# Patient Record
Sex: FEMALE | Race: WHITE | NOT HISPANIC OR LATINO | Employment: FULL TIME | URBAN - NONMETROPOLITAN AREA
[De-identification: names, ages, dates, MRNs, and addresses within clinical notes are randomized per-mention and may not be internally consistent; named-entity substitution may affect disease eponyms.]

---

## 2017-06-16 ENCOUNTER — OFFICE VISIT (OUTPATIENT)
Dept: FAMILY MEDICINE CLINIC | Facility: CLINIC | Age: 62
End: 2017-06-16

## 2017-06-16 VITALS
HEART RATE: 65 BPM | SYSTOLIC BLOOD PRESSURE: 151 MMHG | DIASTOLIC BLOOD PRESSURE: 90 MMHG | WEIGHT: 241 LBS | OXYGEN SATURATION: 96 % | TEMPERATURE: 98 F | BODY MASS INDEX: 40.15 KG/M2 | HEIGHT: 65 IN | RESPIRATION RATE: 18 BRPM

## 2017-06-16 DIAGNOSIS — E66.01 MORBID OBESITY WITH BMI OF 40.0-44.9, ADULT (HCC): ICD-10-CM

## 2017-06-16 DIAGNOSIS — E03.8 OTHER SPECIFIED HYPOTHYROIDISM: Primary | ICD-10-CM

## 2017-06-16 DIAGNOSIS — Z00.00 HEALTH CARE MAINTENANCE: ICD-10-CM

## 2017-06-16 LAB
ALBUMIN SERPL-MCNC: 3.9 G/DL (ref 3.5–5)
ALBUMIN/GLOB SERPL: 1.4 G/DL (ref 1–2)
ALP SERPL-CCNC: 66 U/L (ref 38–126)
ALT SERPL-CCNC: 33 U/L (ref 13–69)
AST SERPL-CCNC: 21 U/L (ref 15–46)
BASOPHILS # BLD AUTO: 0.04 10*3/MM3 (ref 0–0.2)
BASOPHILS NFR BLD AUTO: 0.6 % (ref 0–2.5)
BILIRUB SERPL-MCNC: 0.5 MG/DL (ref 0.2–1.3)
BUN SERPL-MCNC: 17 MG/DL (ref 7–20)
BUN/CREAT SERPL: 17 (ref 7.1–23.5)
CALCIUM SERPL-MCNC: 9.7 MG/DL (ref 8.4–10.2)
CHLORIDE SERPL-SCNC: 102 MMOL/L (ref 98–107)
CHOLEST SERPL-MCNC: 213 MG/DL (ref 0–199)
CO2 SERPL-SCNC: 28 MMOL/L (ref 26–30)
CREAT SERPL-MCNC: 1 MG/DL (ref 0.6–1.3)
EOSINOPHIL # BLD AUTO: 0.18 10*3/MM3 (ref 0–0.7)
EOSINOPHIL NFR BLD AUTO: 2.8 % (ref 0–7)
ERYTHROCYTE [DISTWIDTH] IN BLOOD BY AUTOMATED COUNT: 13.8 % (ref 11.5–14.5)
GLOBULIN SER CALC-MCNC: 2.8 GM/DL
GLUCOSE SERPL-MCNC: 93 MG/DL (ref 74–98)
HCT VFR BLD AUTO: 39.6 % (ref 37–47)
HDLC SERPL-MCNC: 61 MG/DL (ref 40–60)
HGB BLD-MCNC: 13.3 G/DL (ref 12–16)
IMM GRANULOCYTES # BLD: 0.01 10*3/MM3 (ref 0–0.06)
IMM GRANULOCYTES NFR BLD: 0.2 % (ref 0–0.6)
LDLC SERPL CALC-MCNC: 129 MG/DL (ref 0–99)
LYMPHOCYTES # BLD AUTO: 1.32 10*3/MM3 (ref 0.6–3.4)
LYMPHOCYTES NFR BLD AUTO: 20.8 % (ref 10–50)
MCH RBC QN AUTO: 31.8 PG (ref 27–31)
MCHC RBC AUTO-ENTMCNC: 33.6 G/DL (ref 30–37)
MCV RBC AUTO: 94.7 FL (ref 81–99)
MONOCYTES # BLD AUTO: 0.59 10*3/MM3 (ref 0–0.9)
MONOCYTES NFR BLD AUTO: 9.3 % (ref 0–12)
NEUTROPHILS # BLD AUTO: 4.21 10*3/MM3 (ref 2–6.9)
NEUTROPHILS NFR BLD AUTO: 66.3 % (ref 37–80)
NRBC BLD AUTO-RTO: 0 /100 WBC (ref 0–0)
PLATELET # BLD AUTO: 334 10*3/MM3 (ref 130–400)
POTASSIUM SERPL-SCNC: 4.4 MMOL/L (ref 3.5–5.1)
PROT SERPL-MCNC: 6.7 G/DL (ref 6.3–8.2)
RBC # BLD AUTO: 4.18 10*6/MM3 (ref 4.2–5.4)
SODIUM SERPL-SCNC: 140 MMOL/L (ref 137–145)
T4 FREE SERPL-MCNC: 1.76 NG/DL (ref 0.78–2.19)
TRIGL SERPL-MCNC: 113 MG/DL
TSH SERPL DL<=0.005 MIU/L-ACNC: 2.73 MIU/ML (ref 0.47–4.68)
VLDLC SERPL CALC-MCNC: 22.6 MG/DL
WBC # BLD AUTO: 6.35 10*3/MM3 (ref 4.8–10.8)

## 2017-06-16 PROCEDURE — 99213 OFFICE O/P EST LOW 20 MIN: CPT | Performed by: INTERNAL MEDICINE

## 2017-06-16 RX ORDER — LEVOTHYROXINE SODIUM 137 UG/1
137 TABLET ORAL DAILY
Qty: 30 TABLET | Refills: 5 | Status: SHIPPED | OUTPATIENT
Start: 2017-06-16 | End: 2018-01-19 | Stop reason: SDUPTHER

## 2017-06-16 RX ORDER — ASPIRIN 81 MG/1
81 TABLET, CHEWABLE ORAL DAILY
COMMUNITY
End: 2021-03-22

## 2017-06-16 RX ORDER — ALBUTEROL SULFATE 90 UG/1
90 AEROSOL, METERED RESPIRATORY (INHALATION) AS NEEDED
COMMUNITY
Start: 2015-10-14 | End: 2018-02-19 | Stop reason: SDUPTHER

## 2017-06-23 NOTE — PROGRESS NOTES
Subjective   Sandra Guerrero is a 61 y.o. female.     Chief Complaint   Patient presents with   • [facility]     meds refill       History of Present Illness   F/U visit.   Patient is on Levothyroxine 137 mcg for Hypothyroidism and Qvar and Albuterol PRN for Asthma.    The following portions of the patient's history were reviewed and updated as appropriate: allergies, current medications, past family history, past medical history, past social history, past surgical history and problem list.    Past Medical History:   Diagnosis Date   • Adverse effect of drug/medicinal     Adverse effect of drug, medicament, or biological substance   • Allergic rhinitis    • Elevated blood pressure reading without diagnosis of hypertension    • History of abnormal mammogram        No past surgical history on file.    No current outpatient prescriptions on file prior to visit.     No current facility-administered medications on file prior to visit.        Social History     Social History   • Marital status:      Spouse name: N/A   • Number of children: N/A   • Years of education: N/A     Occupational History   • Not on file.     Social History Main Topics   • Smoking status: Never Smoker   • Smokeless tobacco: Not on file   • Alcohol use No   • Drug use: No   • Sexual activity: Not on file     Other Topics Concern   • Not on file     Social History Narrative       Review of Systems   Constitutional: Negative for chills, fatigue and fever.   HENT: Negative for congestion, ear pain, rhinorrhea, sinus pressure and sore throat.    Eyes: Negative for visual disturbance.   Respiratory: Negative for cough, chest tightness, shortness of breath and wheezing.    Cardiovascular: Negative for chest pain, palpitations and leg swelling.   Gastrointestinal: Negative for abdominal pain, blood in stool, constipation, diarrhea, nausea and vomiting.   Endocrine: Negative for polydipsia and polyuria.   Genitourinary: Negative for dysuria and  "hematuria.   Musculoskeletal: Negative for back pain.   Skin: Negative for rash.   Neurological: Negative for dizziness, light-headedness, numbness and headaches.   Psychiatric/Behavioral: Negative for dysphoric mood and sleep disturbance. The patient is not nervous/anxious.        Objective   Blood pressure 151/90, pulse 65, temperature 98 °F (36.7 °C), temperature source Oral, resp. rate 18, height 65\" (165.1 cm), weight 241 lb (109 kg), SpO2 96 %.    Physical Exam   Constitutional: She is oriented to person, place, and time. She appears well-nourished. No distress.   HENT:   Head: Atraumatic.   Right Ear: External ear normal.   Left Ear: External ear normal.   Eyes: Conjunctivae are normal. Right eye exhibits no discharge. Left eye exhibits no discharge.   Neck: Normal range of motion. Neck supple.   Cardiovascular: Normal rate and regular rhythm.    No murmur heard.  Pulmonary/Chest: Effort normal and breath sounds normal. She has no wheezes. She has no rales.   Abdominal: Soft. Bowel sounds are normal. She exhibits no distension. There is no tenderness.   Neurological: She is alert and oriented to person, place, and time.   Skin: No rash noted. She is not diaphoretic.   Psychiatric: She has a normal mood and affect.   Nursing note and vitals reviewed.    Assessment/Plan   Sandra was seen today for establish care.    Diagnoses and all orders for this visit:    Other specified hypothyroidism  -     Cancel: TSH  -     Cancel: T4, free  -     Cancel: Comprehensive Metabolic Panel  -     Cancel: CBC w AUTO Differential  -     CBC & Differential  -     Comprehensive Metabolic Panel  -     TSH  -     T4, free  -     Lipid panel    Morbid obesity with BMI of 40.0-44.9, adult  -     Cancel: Comprehensive Metabolic Panel  -     Cancel: CBC w AUTO Differential  -     Cancel: Lipid Panel  -     CBC & Differential  -     Comprehensive Metabolic Panel  -     TSH  -     T4, free  -     Lipid panel    Health care maintenance  - "     Cancel: Occult Blood, Fecal By Immunoassay    Other orders  -     levothyroxine (SYNTHROID, LEVOTHROID) 137 MCG tablet; Take 1 tablet by mouth Daily.               Return in about 3 months (around 9/16/2017).    There are no Patient Instructions on file for this visit.

## 2017-08-09 ENCOUNTER — TRANSCRIBE ORDERS (OUTPATIENT)
Dept: MAMMOGRAPHY | Facility: HOSPITAL | Age: 62
End: 2017-08-09

## 2017-08-09 DIAGNOSIS — Z12.39 SCREENING FOR BREAST CANCER: Primary | ICD-10-CM

## 2017-08-30 ENCOUNTER — APPOINTMENT (OUTPATIENT)
Dept: MAMMOGRAPHY | Facility: HOSPITAL | Age: 62
End: 2017-08-30

## 2018-01-19 ENCOUNTER — TELEPHONE (OUTPATIENT)
Dept: INTERNAL MEDICINE | Facility: CLINIC | Age: 63
End: 2018-01-19

## 2018-01-19 RX ORDER — LEVOTHYROXINE SODIUM 137 UG/1
137 TABLET ORAL DAILY
Qty: 30 TABLET | Refills: 0 | Status: SHIPPED | OUTPATIENT
Start: 2018-01-19 | End: 2018-02-05 | Stop reason: SDUPTHER

## 2018-02-05 ENCOUNTER — OFFICE VISIT (OUTPATIENT)
Dept: INTERNAL MEDICINE | Facility: CLINIC | Age: 63
End: 2018-02-05

## 2018-02-05 VITALS
TEMPERATURE: 98.4 F | OXYGEN SATURATION: 98 % | DIASTOLIC BLOOD PRESSURE: 98 MMHG | BODY MASS INDEX: 38.15 KG/M2 | HEART RATE: 69 BPM | WEIGHT: 229 LBS | SYSTOLIC BLOOD PRESSURE: 158 MMHG | HEIGHT: 65 IN

## 2018-02-05 DIAGNOSIS — I10 ESSENTIAL HYPERTENSION: ICD-10-CM

## 2018-02-05 DIAGNOSIS — E03.9 HYPOTHYROIDISM (ACQUIRED): Primary | ICD-10-CM

## 2018-02-05 PROCEDURE — 99214 OFFICE O/P EST MOD 30 MIN: CPT | Performed by: PHYSICIAN ASSISTANT

## 2018-02-05 RX ORDER — LEVOTHYROXINE SODIUM 137 UG/1
137 TABLET ORAL DAILY
Qty: 30 TABLET | Refills: 0 | Status: SHIPPED | OUTPATIENT
Start: 2018-02-05 | End: 2018-02-19 | Stop reason: SDUPTHER

## 2018-02-05 RX ORDER — LOSARTAN POTASSIUM 25 MG/1
25 TABLET ORAL DAILY
Qty: 30 TABLET | Refills: 1 | Status: SHIPPED | OUTPATIENT
Start: 2018-02-05 | End: 2018-02-19 | Stop reason: SDUPTHER

## 2018-02-05 NOTE — PROGRESS NOTES
Sandra Guerrero is a 62 y.o. female.     Subjective   History of Present Illness   Here today for follow up of hypothyroidism. She has been taking 137 mcg daily on an empty stomach and waits at least 30 minutes before eating or taking any other medications. She feels well on this current dose. Denies abnormal fatigue.      She has never been diagnosed with hypertension, despite consistently elevated readings (142-158 systolic) since 2015. Patient denies chest pain, dyspnea, orthopnea, palpitations, lower extremity edema, headaches, weakness, visual disturbances or confusion.          The following portions of the patient's history were reviewed and updated as appropriate: allergies, current medications, past family history, past medical history, past social history, past surgical history and problem list.    Review of Systems    Constitutional: Negative for appetite change, chills, fatigue, fever and unexpected weight change.   HENT: Negative for congestion, ear pain, hearing loss, nosebleeds, postnasal drip, rhinorrhea, sore throat, tinnitus and trouble swallowing.    Eyes: Negative for photophobia, discharge and visual disturbance.   Respiratory: Negative for cough, chest tightness, shortness of breath and wheezing.    Cardiovascular: Negative for chest pain, palpitations and leg swelling.   Gastrointestinal: Negative for abdominal distention, abdominal pain, blood in stool, constipation, diarrhea, nausea and vomiting.   Endocrine: Negative for cold intolerance, heat intolerance, polydipsia, polyphagia and polyuria.   Musculoskeletal: Negative for arthralgias, back pain, joint swelling, myalgias, neck pain and neck stiffness.   Skin: Negative for color change, pallor, rash and wound.   Allergic/Immunologic: Negative for environmental allergies, food allergies and immunocompromised state.   Neurological: Negative for dizziness, tremors, seizures, weakness, numbness and headaches.   Hematological: Negative for  "adenopathy. Does not bruise/bleed easily.   Psychiatric/Behavioral: Negative for sleep disturbances, agitation, behavioral problems, confusion, hallucinations, self-injury and suicidal ideas. The patient is not nervous/anxious.      Objective    Physical Exam  Constitutional: Oriented to person, place, and time. Appears well-developed and well-nourished.   HENT:   Head: Normocephalic and atraumatic.   Eyes: EOM are normal. Pupils are equal, round, and reactive to light.   Neck: Normal range of motion. Neck supple.   Cardiovascular: Normal rate, regular rhythm and normal heart sounds.    Pulmonary/Chest: Effort normal and breath sounds normal. No respiratory distress.  Has no wheezes or rales. Exhibits no chest wall tenderness.   Abdominal: Soft. Bowel sounds are normal. Exhibits no distension and no mass. There is no tenderness.   Musculoskeletal: Normal range of motion. Exhibits no tenderness.   Neurological: Alert and oriented to person, place, and time.   Skin: Skin is warm and dry.   Psychiatric: Has a normal mood and affect. Behavior is normal. Judgment and thought content normal.       /98  Pulse 69  Temp 98.4 °F (36.9 °C)  Ht 165.1 cm (65\")  Wt 104 kg (229 lb)  SpO2 98%  BMI 38.11 kg/m2    Nursing note and vitals reviewed.        Assessment/Plan   Sandra was seen today for establish care and hypothyroidism.    Diagnoses and all orders for this visit:    Hypothyroidism (acquired)  -     TSH  -     T4  -     T3, Free    Clinically euthyroid. Unless labs indicate otherwise, continue levothyroxine (SYNTHROID, LEVOTHROID) 137 MCG tablet; Take 1 tablet by mouth Daily.    Essential hypertension  -     Comprehensive Metabolic Panel  -     Begin: losartan (COZAAR) 25 MG tablet; Take 1 tablet by mouth Daily.  Discussed risks, benefits and potential side effects of medication.     Encouraged low-sodium diet. Increase physical activity.       F/u in 2 weeks to recheck blood pressure and evaluate efficacy of " Losartan.

## 2018-02-10 LAB
ALBUMIN SERPL-MCNC: 3.8 G/DL (ref 3.5–5)
ALBUMIN/GLOB SERPL: 1.4 G/DL (ref 1–2)
ALP SERPL-CCNC: 59 U/L (ref 38–126)
ALT SERPL-CCNC: 27 U/L (ref 13–69)
AST SERPL-CCNC: 16 U/L (ref 15–46)
BILIRUB SERPL-MCNC: 0.4 MG/DL (ref 0.2–1.3)
BUN SERPL-MCNC: 21 MG/DL (ref 7–20)
BUN/CREAT SERPL: 21 (ref 7.1–23.5)
CALCIUM SERPL-MCNC: 9.4 MG/DL (ref 8.4–10.2)
CHLORIDE SERPL-SCNC: 105 MMOL/L (ref 98–107)
CO2 SERPL-SCNC: 27 MMOL/L (ref 26–30)
CREAT SERPL-MCNC: 1 MG/DL (ref 0.6–1.3)
GFR SERPLBLD CREATININE-BSD FMLA CKD-EPI: 57 ML/MIN/1.73
GFR SERPLBLD CREATININE-BSD FMLA CKD-EPI: 69 ML/MIN/1.73
GLOBULIN SER CALC-MCNC: 2.8 GM/DL
GLUCOSE SERPL-MCNC: 86 MG/DL (ref 74–98)
POTASSIUM SERPL-SCNC: 4.7 MMOL/L (ref 3.5–5.1)
PROT SERPL-MCNC: 6.6 G/DL (ref 6.3–8.2)
SODIUM SERPL-SCNC: 144 MMOL/L (ref 137–145)
T3FREE SERPL-MCNC: 2.3 PG/ML (ref 2–4.4)
T4 SERPL-MCNC: 11.1 UG/DL (ref 4.5–12)
TSH SERPL DL<=0.005 MIU/L-ACNC: 3.18 MIU/ML (ref 0.47–4.68)

## 2018-02-19 ENCOUNTER — OFFICE VISIT (OUTPATIENT)
Dept: INTERNAL MEDICINE | Facility: CLINIC | Age: 63
End: 2018-02-19

## 2018-02-19 VITALS
WEIGHT: 229 LBS | HEIGHT: 65 IN | TEMPERATURE: 98.4 F | BODY MASS INDEX: 38.15 KG/M2 | SYSTOLIC BLOOD PRESSURE: 130 MMHG | HEART RATE: 78 BPM | DIASTOLIC BLOOD PRESSURE: 88 MMHG | OXYGEN SATURATION: 98 %

## 2018-02-19 DIAGNOSIS — I10 ESSENTIAL HYPERTENSION: ICD-10-CM

## 2018-02-19 DIAGNOSIS — J45.20 MILD INTERMITTENT ASTHMA WITHOUT COMPLICATION: Primary | ICD-10-CM

## 2018-02-19 PROCEDURE — 99214 OFFICE O/P EST MOD 30 MIN: CPT | Performed by: PHYSICIAN ASSISTANT

## 2018-02-19 RX ORDER — LEVOTHYROXINE SODIUM 137 UG/1
137 TABLET ORAL DAILY
Qty: 30 TABLET | Refills: 5 | Status: SHIPPED | OUTPATIENT
Start: 2018-02-19 | End: 2018-07-18 | Stop reason: SDUPTHER

## 2018-02-19 RX ORDER — LOSARTAN POTASSIUM 50 MG/1
50 TABLET ORAL DAILY
Qty: 30 TABLET | Refills: 2 | Status: SHIPPED | OUTPATIENT
Start: 2018-02-19 | End: 2018-05-16 | Stop reason: SDUPTHER

## 2018-02-19 RX ORDER — ALBUTEROL SULFATE 90 UG/1
2 AEROSOL, METERED RESPIRATORY (INHALATION) EVERY 4 HOURS PRN
Qty: 1 INHALER | Refills: 2 | Status: SHIPPED | OUTPATIENT
Start: 2018-02-19 | End: 2019-01-18 | Stop reason: SDUPTHER

## 2018-02-19 NOTE — PROGRESS NOTES
Sandra Guerrero is a 62 y.o. female.     Subjective   History of Present Illness   Here today for 2 week follow up for new diagnosis of hypertension. She has been taking Losartan 25 mg daily as directed and reports she has not had any adverse reaction or side effects. Patient denies chest pain, dyspnea, orthopnea, palpitations, lower extremity edema, headaches, weakness, visual disturbances or confusion. She has been limiting her sodium intake and attempting to be more active.     She reports long-term seasonal trouble with Asthma and allergies. She previously used Advair which caused her blood pressure to elevate.  She has not had any trouble with Qvar but notes that it it expensive so she uses it sparingly. She denies SOA, cough or rhinorrhea today.       The following portions of the patient's history were reviewed and updated as appropriate: allergies, current medications, past family history, past medical history, past social history, past surgical history and problem list.    Review of Systems    Constitutional: Negative for appetite change, chills, fatigue, fever and unexpected weight change.   HENT: Negative for congestion, ear pain, hearing loss, nosebleeds, postnasal drip, rhinorrhea, sore throat, tinnitus and trouble swallowing.    Eyes: Negative for photophobia, discharge and visual disturbance.   Respiratory: Negative for cough, chest tightness, shortness of breath and wheezing.    Cardiovascular: Negative for chest pain, palpitations and leg swelling.   Gastrointestinal: Negative for abdominal distention, abdominal pain, blood in stool, constipation, diarrhea, nausea and vomiting.   Endocrine: Negative for cold intolerance, heat intolerance, polydipsia, polyphagia and polyuria.   Musculoskeletal: Negative for arthralgias, back pain, joint swelling, myalgias, neck pain and neck stiffness.   Skin: Negative for color change, pallor, rash and wound.    Allergic/Immunologic: Negative for environmental  "allergies, food allergies and immunocompromised state.   Neurological: Negative for dizziness, tremors, seizures, weakness, numbness and headaches.   Hematological: Negative for adenopathy. Does not bruise/bleed easily.   Psychiatric/Behavioral: Negative for sleep disturbances, agitation, behavioral problems, confusion, hallucinations, self-injury and suicidal ideas. The patient is not nervous/anxious.        Objective    Physical Exam  Constitutional: Obese. Oriented to person, place, and time. Appears well-developed and well-nourished.   HENT:   Head: Normocephalic and atraumatic.   Eyes: EOM are normal. Pupils are equal, round, and reactive to light.   Neck: Normal range of motion. Neck supple.   Cardiovascular: Normal rate, regular rhythm and normal heart sounds.    Pulmonary/Chest: Effort normal and breath sounds normal. No respiratory distress.  Has no wheezes or rales. Exhibits no chest wall tenderness.   Abdominal: Soft. Bowel sounds are normal. Exhibits no distension and no mass. There is no tenderness.   Musculoskeletal: Normal range of motion. Exhibits no tenderness.   Neurological: Alert and oriented to person, place, and time.   Skin: Skin is warm and dry.   Psychiatric: Has a normal mood and affect. Behavior is normal. Judgment and thought content normal.       /88  Pulse 78  Temp 98.4 °F (36.9 °C)  Ht 165.1 cm (65\")  Wt 104 kg (229 lb)  SpO2 98%  BMI 38.11 kg/m2    Nursing note and vitals reviewed.        Assessment/Plan   Sandra was seen today for follow-up and hypertension.    Diagnoses and all orders for this visit:    Mild intermittent asthma without complication  -     Refilled (likely needs PA, gave sample of Asmanex which she feels may get her through the entire season so will wait on PA): beclomethasone (QVAR) 80 MCG/ACT inhaler; Inhale 2 puffs 2 (Two) Times a Day.  -     Refilled: albuterol (VENTOLIN HFA) 108 (90 Base) MCG/ACT inhaler; Inhale 2 puffs Every 4 (Four) Hours As Needed " for Wheezing or Shortness of Air.  -     Sample: Mometasone Furoate (ASMANEX HFA) 100 MCG/ACT aerosol; Inhale 2 puffs 2 (Two) Times a Day As Needed (asthma).  Continue low-sodium diet. Encouraged increased physical activity and attempted weight loss.     Essential hypertension  -     Increase dose to further improve HTN: losartan (COZAAR) 50 MG tablet; Take 1 tablet by mouth Daily. Indications: High Blood Pressure Disorder    Labs reviewed with patient.       F/u 3 months.

## 2018-05-16 DIAGNOSIS — I10 ESSENTIAL HYPERTENSION: ICD-10-CM

## 2018-05-17 RX ORDER — LOSARTAN POTASSIUM 50 MG/1
TABLET ORAL
Qty: 30 TABLET | Refills: 1 | Status: SHIPPED | OUTPATIENT
Start: 2018-05-17 | End: 2018-06-22 | Stop reason: SDUPTHER

## 2018-06-18 ENCOUNTER — TRANSCRIBE ORDERS (OUTPATIENT)
Dept: INTERNAL MEDICINE | Facility: CLINIC | Age: 63
End: 2018-06-18

## 2018-06-18 DIAGNOSIS — Z12.39 SCREENING BREAST EXAMINATION: Primary | ICD-10-CM

## 2018-06-22 ENCOUNTER — OFFICE VISIT (OUTPATIENT)
Dept: INTERNAL MEDICINE | Facility: CLINIC | Age: 63
End: 2018-06-22

## 2018-06-22 VITALS
WEIGHT: 231 LBS | DIASTOLIC BLOOD PRESSURE: 92 MMHG | HEIGHT: 65 IN | TEMPERATURE: 98.4 F | HEART RATE: 65 BPM | SYSTOLIC BLOOD PRESSURE: 142 MMHG | BODY MASS INDEX: 38.49 KG/M2 | OXYGEN SATURATION: 98 %

## 2018-06-22 DIAGNOSIS — R22.1 LUMP IN THROAT: ICD-10-CM

## 2018-06-22 DIAGNOSIS — E03.9 ACQUIRED HYPOTHYROIDISM: ICD-10-CM

## 2018-06-22 DIAGNOSIS — Z00.00 PREVENTATIVE HEALTH CARE: ICD-10-CM

## 2018-06-22 DIAGNOSIS — B35.4 RINGWORM OF BODY: Primary | ICD-10-CM

## 2018-06-22 DIAGNOSIS — I10 ESSENTIAL HYPERTENSION: ICD-10-CM

## 2018-06-22 PROCEDURE — 99214 OFFICE O/P EST MOD 30 MIN: CPT | Performed by: PHYSICIAN ASSISTANT

## 2018-06-22 RX ORDER — PRENATAL VIT 91/IRON/FOLIC/DHA 28-975-200
COMBINATION PACKAGE (EA) ORAL 2 TIMES DAILY PRN
Qty: 24 G | Refills: 1 | Status: SHIPPED | OUTPATIENT
Start: 2018-06-22 | End: 2019-01-18

## 2018-06-22 RX ORDER — LOSARTAN POTASSIUM 50 MG/1
75 TABLET ORAL DAILY
Qty: 30 TABLET | Refills: 5 | Status: SHIPPED | OUTPATIENT
Start: 2018-06-22 | End: 2018-11-15 | Stop reason: SDUPTHER

## 2018-06-22 NOTE — PATIENT INSTRUCTIONS
Body Ringworm  Body ringworm is an infection of the skin that often causes a ring-shaped rash. Body ringworm can affect any part of your skin. It can spread easily to others. Body ringworm is also called tinea corporis.  What are the causes?  This condition is caused by funguses called dermatophytes. The condition develops when these funguses grow out of control on the skin.  You can get this condition if you touch a person or animal that has it. You can also get it if you share clothing, bedding, towels, or any other object with an infected person or pet.  What increases the risk?  This condition is more likely to develop in:  · Athletes who often make skin-to-skin contact with other athletes, such as wrestlers.  · People who share equipment and mats.  · People with a weakened immune system.    What are the signs or symptoms?  Symptoms of this condition include:  · Itchy, raised red spots and bumps.  · Red scaly patches.  · A ring-shaped rash. The rash may have:  ? A clear center.  ? Scales or red bumps at its center.  ? Redness near its borders.  ? Dry and scaly skin on or around it.    How is this diagnosed?  This condition can usually be diagnosed with a skin exam. A skin scraping may be taken from the affected area and examined under a microscope to see if the fungus is present.  How is this treated?  This condition may be treated with:  · An antifungal cream or ointment.  · An antifungal shampoo.  · Antifungal medicines. These may be prescribed if your ringworm is severe, keeps coming back, or lasts a long time.    Follow these instructions at home:  · Take over-the-counter and prescription medicines only as told by your health care provider.  · If you were given an antifungal cream or ointment:  ? Use it as told by your health care provider.  ? Wash the infected area and dry it completely before applying the cream or ointment.  · If you were given an antifungal shampoo:  ? Use it as told by your health care  provider.  ? Leave the shampoo on your body for 3-5 minutes before rinsing.  · While you have a rash:  ? Wear loose clothing to stop clothes from rubbing and irritating it.  ? Wash or change your bed sheets every night.  · If your pet has the same infection, take your pet to see a .  How is this prevented?  · Practice good hygiene.  · Wear sandals or shoes in public places and showers.  · Do not share personal items with others.  · Avoid touching red patches of skin on other people.  · Avoid touching pets that have bald spots.  · If you touch an animal that has a bald spot, wash your hands.  Contact a health care provider if:  · Your rash continues to spread after 7 days of treatment.  · Your rash is not gone in 4 weeks.  · The area around your rash gets red, warm, tender, and swollen.  This information is not intended to replace advice given to you by your health care provider. Make sure you discuss any questions you have with your health care provider.  Document Released: 12/15/2001 Document Revised: 05/25/2017 Document Reviewed: 10/13/2016  [employer] Interactive Patient Education © 2018 [employer]

## 2018-06-22 NOTE — PROGRESS NOTES
Sandra Guerrero is a 62 y.o. female.     Subjective   History of Present Illness   Here today for follow up of HTN. She has been taking Losartan 50 mg daily as directed. She has had increased headache frequency but nothing severe and resolves on its own. Patient denies chest pain, dyspnea, orthopnea, palpitations, lower extremity edema, weakness, visual disturbances or confusion.     Rash: red itchy areas which become scaly. Triamcinolone topical helps but not all spots clear up with treatment. Currently has 2 large ones on the right thigh and a small area on her chest.     She has also had pressure in the right side of her neck and sensation of lump in her throat for the last 4-5 weeks with no change since onset. No SOA or trouble swallowing.         The following portions of the patient's history were reviewed and updated as appropriate: allergies, current medications, past family history, past medical history, past social history, past surgical history and problem list.    Review of Systems    Constitutional: Negative for appetite change, chills, fatigue, fever and unexpected weight change.   HENT: lump in throat. Negative for congestion, ear pain, hearing loss, nosebleeds, postnasal drip, rhinorrhea, sore throat, tinnitus and trouble swallowing.    Eyes: Negative for photophobia, discharge and visual disturbance.   Respiratory: Negative for cough, chest tightness, shortness of breath and wheezing.    Cardiovascular: Negative for chest pain, palpitations and leg swelling.   Gastrointestinal: Negative for abdominal distention, abdominal pain, blood in stool, constipation, diarrhea, nausea and vomiting.   Endocrine: Negative for cold intolerance, heat intolerance, polydipsia, polyphagia and polyuria.   Musculoskeletal: neck pressure. Negative for arthralgias, back pain, joint swelling, myalgias and neck stiffness.   Skin: rash. Negative for color change, pallor and wound.   Allergic/Immunologic: Negative for  "environmental allergies, food allergies and immunocompromised state.   Neurological: headache. Negative for dizziness, tremors, seizures, weakness, numbness.  Hematological: Negative for adenopathy. Does not bruise/bleed easily.   Psychiatric/Behavioral: Negative for sleep disturbances, agitation, behavioral problems, confusion, hallucinations, self-injury and suicidal ideas. The patient is not nervous/anxious.      Objective    Physical Exam  Constitutional: Oriented to person, place, and time. Appears well-developed and well-nourished.   Head: Normocephalic and atraumatic.   Eyes: EOM are normal. Pupils are equal, round, and reactive to light.   Neck: mild thyromegaly. Normal range of motion. Neck supple.   Cardiovascular: Normal rate, regular rhythm and normal heart sounds.    Pulmonary/Chest: Effort normal and breath sounds normal. No respiratory distress.  Has no wheezes or rales. Exhibits no chest wall tenderness.   Abdominal: Soft. Bowel sounds are normal. Exhibits no distension and no mass. There is no tenderness.   Musculoskeletal: Normal range of motion. Exhibits no tenderness.   Neurological: Alert and oriented to person, place, and time.   Skin: erythematous, annular, scaling patch consistent with ringworm on right thigh and chest.  Skin is warm and dry.   Psychiatric: Has a normal mood and affect. Behavior is normal. Judgment and thought content normal.       /92   Pulse 65   Temp 98.4 °F (36.9 °C)   Ht 165.1 cm (65\")   Wt 105 kg (231 lb)   SpO2 98%   BMI 38.44 kg/m²     Nursing note and vitals reviewed.        Assessment/Plan   Sandra was seen today for follow-up and hypertension.    Diagnoses and all orders for this visit:    Ringworm of body  -     Begin: terbinafine (lamISIL) 1 % cream; Apply  topically 2 (Two) Times a Day As Needed (rash).    Essential hypertension  -     Dose increase: losartan (COZAAR) 50 MG tablet; Take 1.5 tablets by mouth Daily.  -     Comprehensive Metabolic " Panel    Acquired hypothyroidism  -     TSH  -     US Thyroid    Lump in throat  -     US Thyroid    Preventative health care  -     Lipid Panel      Return in 6 months for annual physical or sooner if needed.

## 2018-07-02 LAB
ALBUMIN SERPL-MCNC: 3.6 G/DL (ref 3.5–5)
ALBUMIN/GLOB SERPL: 1.2 G/DL (ref 1–2)
ALP SERPL-CCNC: 70 U/L (ref 38–126)
ALT SERPL-CCNC: 21 U/L (ref 13–69)
AST SERPL-CCNC: 18 U/L (ref 15–46)
BILIRUB SERPL-MCNC: 0.5 MG/DL (ref 0.2–1.3)
BUN SERPL-MCNC: 15 MG/DL (ref 7–20)
BUN/CREAT SERPL: 18.8 (ref 7.1–23.5)
CALCIUM SERPL-MCNC: 9.1 MG/DL (ref 8.4–10.2)
CHLORIDE SERPL-SCNC: 107 MMOL/L (ref 98–107)
CHOLEST SERPL-MCNC: 202 MG/DL (ref 0–199)
CO2 SERPL-SCNC: 26 MMOL/L (ref 26–30)
CREAT SERPL-MCNC: 0.8 MG/DL (ref 0.6–1.3)
GLOBULIN SER CALC-MCNC: 2.9 GM/DL
GLUCOSE SERPL-MCNC: 91 MG/DL (ref 74–98)
HDLC SERPL-MCNC: 57 MG/DL (ref 40–60)
LDLC SERPL CALC-MCNC: 129 MG/DL (ref 0–99)
POTASSIUM SERPL-SCNC: 4.3 MMOL/L (ref 3.5–5.1)
PROT SERPL-MCNC: 6.5 G/DL (ref 6.3–8.2)
SODIUM SERPL-SCNC: 139 MMOL/L (ref 137–145)
TRIGL SERPL-MCNC: 80 MG/DL
TSH SERPL DL<=0.005 MIU/L-ACNC: 2.77 MIU/ML (ref 0.47–4.68)
VLDLC SERPL CALC-MCNC: 16 MG/DL

## 2018-07-05 ENCOUNTER — HOSPITAL ENCOUNTER (OUTPATIENT)
Dept: ULTRASOUND IMAGING | Facility: HOSPITAL | Age: 63
Discharge: HOME | End: 2018-07-05
Admitting: PHYSICIAN ASSISTANT

## 2018-07-05 PROCEDURE — 76536 US EXAM OF HEAD AND NECK: CPT

## 2018-07-18 RX ORDER — LEVOTHYROXINE SODIUM 137 UG/1
137 TABLET ORAL DAILY
Qty: 30 TABLET | Refills: 5 | Status: SHIPPED | OUTPATIENT
Start: 2018-07-18 | End: 2019-01-18 | Stop reason: SDUPTHER

## 2018-07-18 RX ORDER — LEVOTHYROXINE SODIUM 137 UG/1
TABLET ORAL
Qty: 30 TABLET | Refills: 4 | OUTPATIENT
Start: 2018-07-18

## 2018-08-31 ENCOUNTER — HOSPITAL ENCOUNTER (OUTPATIENT)
Dept: MAMMOGRAPHY | Facility: HOSPITAL | Age: 63
Discharge: HOME | End: 2018-08-31
Admitting: INTERNAL MEDICINE

## 2018-08-31 DIAGNOSIS — Z12.39 SCREENING BREAST EXAMINATION: ICD-10-CM

## 2018-08-31 PROCEDURE — 77067 SCR MAMMO BI INCL CAD: CPT

## 2018-08-31 PROCEDURE — 77063 BREAST TOMOSYNTHESIS BI: CPT

## 2018-09-06 ENCOUNTER — TRANSCRIBE ORDERS (OUTPATIENT)
Dept: MAMMOGRAPHY | Facility: HOSPITAL | Age: 63
End: 2018-09-06

## 2018-09-06 DIAGNOSIS — R92.2 INCONCLUSIVE MAMMOGRAM: Primary | ICD-10-CM

## 2018-10-05 ENCOUNTER — HOSPITAL ENCOUNTER (OUTPATIENT)
Dept: ULTRASOUND IMAGING | Facility: HOSPITAL | Age: 63
Discharge: HOME | End: 2018-10-05

## 2018-10-05 ENCOUNTER — HOSPITAL ENCOUNTER (OUTPATIENT)
Dept: MAMMOGRAPHY | Facility: HOSPITAL | Age: 63
Discharge: HOME | End: 2018-10-05
Admitting: PHYSICIAN ASSISTANT

## 2018-10-05 DIAGNOSIS — R92.2 INCONCLUSIVE MAMMOGRAM: ICD-10-CM

## 2018-10-05 PROCEDURE — 77066 DX MAMMO INCL CAD BI: CPT

## 2018-10-05 PROCEDURE — 76642 ULTRASOUND BREAST LIMITED: CPT

## 2018-10-05 PROCEDURE — G0279 TOMOSYNTHESIS, MAMMO: HCPCS

## 2018-11-15 DIAGNOSIS — I10 ESSENTIAL HYPERTENSION: ICD-10-CM

## 2018-11-15 RX ORDER — LOSARTAN POTASSIUM 50 MG/1
75 TABLET ORAL DAILY
Qty: 45 TABLET | Refills: 2 | Status: SHIPPED | OUTPATIENT
Start: 2018-11-15 | End: 2019-01-18 | Stop reason: SDUPTHER

## 2019-01-18 ENCOUNTER — OFFICE VISIT (OUTPATIENT)
Dept: INTERNAL MEDICINE | Facility: CLINIC | Age: 64
End: 2019-01-18

## 2019-01-18 VITALS
OXYGEN SATURATION: 98 % | BODY MASS INDEX: 39.15 KG/M2 | SYSTOLIC BLOOD PRESSURE: 148 MMHG | TEMPERATURE: 98.2 F | HEIGHT: 65 IN | DIASTOLIC BLOOD PRESSURE: 100 MMHG | WEIGHT: 235 LBS | HEART RATE: 81 BPM

## 2019-01-18 DIAGNOSIS — E03.9 HYPOTHYROIDISM (ACQUIRED): ICD-10-CM

## 2019-01-18 DIAGNOSIS — Z23 NEED FOR PROPHYLACTIC VACCINATION AGAINST HEPATITIS A: ICD-10-CM

## 2019-01-18 DIAGNOSIS — I10 ESSENTIAL HYPERTENSION: ICD-10-CM

## 2019-01-18 DIAGNOSIS — Z23 NEED FOR TDAP VACCINATION: Primary | ICD-10-CM

## 2019-01-18 DIAGNOSIS — J45.20 MILD INTERMITTENT ASTHMA WITHOUT COMPLICATION: ICD-10-CM

## 2019-01-18 PROCEDURE — 99214 OFFICE O/P EST MOD 30 MIN: CPT | Performed by: PHYSICIAN ASSISTANT

## 2019-01-18 PROCEDURE — 90632 HEPA VACCINE ADULT IM: CPT | Performed by: PHYSICIAN ASSISTANT

## 2019-01-18 PROCEDURE — 90471 IMMUNIZATION ADMIN: CPT | Performed by: PHYSICIAN ASSISTANT

## 2019-01-18 PROCEDURE — 90472 IMMUNIZATION ADMIN EACH ADD: CPT | Performed by: PHYSICIAN ASSISTANT

## 2019-01-18 PROCEDURE — 90715 TDAP VACCINE 7 YRS/> IM: CPT | Performed by: PHYSICIAN ASSISTANT

## 2019-01-18 RX ORDER — AMLODIPINE BESYLATE 5 MG/1
5 TABLET ORAL DAILY
Qty: 30 TABLET | Refills: 5 | Status: SHIPPED | OUTPATIENT
Start: 2019-01-18 | End: 2019-06-27 | Stop reason: SDUPTHER

## 2019-01-18 RX ORDER — ALBUTEROL SULFATE 90 UG/1
2 AEROSOL, METERED RESPIRATORY (INHALATION) EVERY 4 HOURS PRN
Qty: 1 INHALER | Refills: 2 | Status: SHIPPED | OUTPATIENT
Start: 2019-01-18 | End: 2020-09-24

## 2019-01-18 RX ORDER — LOSARTAN POTASSIUM 50 MG/1
75 TABLET ORAL DAILY
Qty: 45 TABLET | Refills: 5 | Status: SHIPPED | OUTPATIENT
Start: 2019-01-18 | End: 2019-06-27 | Stop reason: SDUPTHER

## 2019-01-18 RX ORDER — INFLUENZA A VIRUS A/SINGAPORE/GP1908/2015 IVR-180 (H1N1) ANTIGEN (MDCK CELL DERIVED, PROPIOLACTONE INACTIVATED), INFLUENZA A VIRUS A/NORTH CAROLINA/04/2016 (H3N2) HEMAGGLUTININ ANTIGEN (MDCK CELL DERIVED, PROPIOLACTONE INACTIVATED), INFLUENZA B VIRUS B/IOWA/06/2017 HEMAGGLUTININ ANTIGEN (MDCK CELL DERIVED, PROPIOLACTONE INACTIVATED), INFLUENZA B VIRUS B/SINGAPORE/INFTT-16-0610/2016 HEMAGGLUTININ ANTIGEN (MDCK CELL DERIVED, PROPIOLACTONE INACTIVATED) 15; 15; 15; 15 UG/.5ML; UG/.5ML; UG/.5ML; UG/.5ML
INJECTION, SUSPENSION INTRAMUSCULAR
Refills: 0 | COMMUNITY
Start: 2018-10-27 | End: 2019-01-18

## 2019-01-18 RX ORDER — RANITIDINE 300 MG/1
300 TABLET ORAL
Refills: 3 | COMMUNITY
Start: 2018-12-30 | End: 2019-07-03

## 2019-01-18 RX ORDER — LEVOTHYROXINE SODIUM 137 UG/1
137 TABLET ORAL DAILY
Qty: 30 TABLET | Refills: 5 | Status: SHIPPED | OUTPATIENT
Start: 2019-01-18 | End: 2019-06-27 | Stop reason: SDUPTHER

## 2019-01-18 NOTE — PROGRESS NOTES
Sandra Guerrero is a 63 y.o. female.     Subjective   History of Present Illness   Here today for follow up of hypertension, asthma and hypothyroidism.     HTN: 6 months ago her Losartan dose was increased from 50 to 75 mg daily which has not improved her blood pressure. She does not monitor her blood pressure at home. Patient denies chest pain, dyspnea, orthopnea, palpitations, lower extremity edema, headaches, weakness, visual disturbances or confusion. She has never taken any other blood pressure medication besides Losartan.     Thyroid: she is taking levothyroxine each morning on an empty stomach and waiting at least 30 minutes before eating or taking other medications. She denies abnormal fatigue, weight change, temperature intolerance, skin changes, bowel habit changes or heart rate changes.     Asthma has been well controlled. She reports that she seldom uses Asmanex inhaler. She almost never uses her albuterol inhaler. She denies daily cough, SOA, wheezing or chest tightness.           The following portions of the patient's history were reviewed and updated as appropriate: allergies, current medications, past family history, past medical history, past social history, past surgical history and problem list.    Review of Systems    Constitutional: Negative for appetite change, chills, fatigue, fever and unexpected weight change.   Eyes: Negative for photophobia, discharge and visual disturbance.   Respiratory: Negative for cough, chest tightness, shortness of breath and wheezing.    Cardiovascular: Negative for chest pain, palpitations and leg swelling.   Gastrointestinal: Negative for abdominal distention, abdominal pain, blood in stool, constipation, diarrhea, nausea and vomiting.   Endocrine: Negative for cold intolerance, heat intolerance, polydipsia, polyphagia and polyuria.   Musculoskeletal: Negative for arthralgias, back pain, joint swelling, myalgias, neck pain and neck stiffness.   Skin: Negative for  "color change, pallor, rash and wound.   Allergic/Immunologic: Negative for environmental allergies, food allergies and immunocompromised state.   Neurological: Negative for dizziness, tremors, seizures, weakness, numbness and headaches.   Hematological: Negative for adenopathy. Does not bruise/bleed easily.   Psychiatric/Behavioral: Negative for sleep disturbances, agitation, behavioral problems, confusion, hallucinations, self-injury and suicidal ideas. The patient is not nervous/anxious.      Objective    Physical Exam  Constitutional: Appears well-developed and well-nourished.   Head: Normocephalic and atraumatic.   Eyes: EOM are normal. Pupils are equal, round, and reactive to light.   Neck: Normal range of motion. Neck supple.   Cardiovascular: trace bilateral pitting edema. Normal rate, regular rhythm and normal heart sounds.    Pulmonary/Chest: Effort normal and breath sounds normal. No respiratory distress.  Has no wheezes or rales. Exhibits no chest wall tenderness.   Abdominal: Soft. Bowel sounds are normal. Exhibits no distension and no mass. There is no tenderness.   Musculoskeletal: Normal range of motion. Exhibits no tenderness.   Neurological: Alert and oriented to person, place, and time.   Skin: Skin is warm and dry.   Psychiatric: Has a normal mood and affect. Behavior is normal. Judgment and thought content normal.       /100   Pulse 81   Temp 98.2 °F (36.8 °C)   Ht 165.1 cm (65\")   Wt 107 kg (235 lb)   SpO2 98%   BMI 39.11 kg/m²     Nursing note and vitals reviewed.        Assessment/Plan   Sandra was seen today for follow-up.    Diagnoses and all orders for this visit:    Need for Tdap vaccination  -     Tdap Vaccine Greater Than or Equal To 6yo IM    Mild intermittent asthma without complication  -     albuterol sulfate HFA (VENTOLIN HFA) 108 (90 Base) MCG/ACT inhaler; Inhale 2 puffs Every 4 (Four) Hours As Needed for Wheezing or Shortness of Air.    Essential hypertension  -     " Continue: losartan (COZAAR) 50 MG tablet; Take 1.5 tablets by mouth Daily.  -     Begin: amLODIPine (NORVASC) 5 MG tablet; Take 1 tablet by mouth Daily.  -     Comprehensive Metabolic Panel  Follow heart healthy/low salt diet.  Avoid processed foods.  Monitor blood pressure as discussed.  Exercise as tolerated up to 30 minutes 5 days per week.  Take medications as prescribed.    Need for prophylactic vaccination against hepatitis A  -     Hepatitis A Vaccine Adult IM    Hypothyroidism (acquired)  -     Continue: levothyroxine (SYNTHROID, LEVOTHROID) 137 MCG tablet; Take 1 tablet by mouth Daily.  -     TSH      Return in 1 month for physical and HTN f/u or sooner if needed.

## 2019-01-19 LAB
ALBUMIN SERPL-MCNC: 4 G/DL (ref 3.6–4.8)
ALBUMIN/GLOB SERPL: 1.6 {RATIO} (ref 1.2–2.2)
ALP SERPL-CCNC: 66 IU/L (ref 39–117)
ALT SERPL-CCNC: 15 IU/L (ref 0–32)
AST SERPL-CCNC: 16 IU/L (ref 0–40)
BILIRUB SERPL-MCNC: 0.2 MG/DL (ref 0–1.2)
BUN SERPL-MCNC: 16 MG/DL (ref 8–27)
BUN/CREAT SERPL: 19 (ref 12–28)
CALCIUM SERPL-MCNC: 9.1 MG/DL (ref 8.7–10.3)
CHLORIDE SERPL-SCNC: 103 MMOL/L (ref 96–106)
CO2 SERPL-SCNC: 24 MMOL/L (ref 20–29)
CREAT SERPL-MCNC: 0.84 MG/DL (ref 0.57–1)
GLOBULIN SER CALC-MCNC: 2.5 G/DL (ref 1.5–4.5)
GLUCOSE SERPL-MCNC: 88 MG/DL (ref 65–99)
POTASSIUM SERPL-SCNC: 4.3 MMOL/L (ref 3.5–5.2)
PROT SERPL-MCNC: 6.5 G/DL (ref 6–8.5)
SODIUM SERPL-SCNC: 141 MMOL/L (ref 134–144)
TSH SERPL DL<=0.005 MIU/L-ACNC: 2.15 UIU/ML (ref 0.45–4.5)

## 2019-02-20 ENCOUNTER — RESULTS ENCOUNTER (OUTPATIENT)
Dept: INTERNAL MEDICINE | Facility: CLINIC | Age: 64
End: 2019-02-20

## 2019-02-20 ENCOUNTER — OFFICE VISIT (OUTPATIENT)
Dept: INTERNAL MEDICINE | Facility: CLINIC | Age: 64
End: 2019-02-20

## 2019-02-20 VITALS
TEMPERATURE: 97.9 F | WEIGHT: 233 LBS | HEART RATE: 71 BPM | DIASTOLIC BLOOD PRESSURE: 70 MMHG | HEIGHT: 65 IN | OXYGEN SATURATION: 98 % | BODY MASS INDEX: 38.82 KG/M2 | SYSTOLIC BLOOD PRESSURE: 136 MMHG

## 2019-02-20 DIAGNOSIS — E03.9 HYPOTHYROIDISM (ACQUIRED): ICD-10-CM

## 2019-02-20 DIAGNOSIS — I10 ESSENTIAL HYPERTENSION: ICD-10-CM

## 2019-02-20 DIAGNOSIS — Z12.11 SCREENING FOR COLON CANCER: ICD-10-CM

## 2019-02-20 DIAGNOSIS — Z00.00 PHYSICAL EXAM: Primary | ICD-10-CM

## 2019-02-20 DIAGNOSIS — Z23 NEED FOR VACCINATION: ICD-10-CM

## 2019-02-20 DIAGNOSIS — J45.20 MILD INTERMITTENT ASTHMA WITHOUT COMPLICATION: ICD-10-CM

## 2019-02-20 DIAGNOSIS — K21.9 GASTROESOPHAGEAL REFLUX DISEASE, ESOPHAGITIS PRESENCE NOT SPECIFIED: ICD-10-CM

## 2019-02-20 LAB
25(OH)D3+25(OH)D2 SERPL-MCNC: 27.5 NG/ML
BASOPHILS # BLD AUTO: 0.05 10*3/MM3 (ref 0–0.2)
BASOPHILS NFR BLD AUTO: 0.8 % (ref 0–2.5)
CHOLEST SERPL-MCNC: 206 MG/DL (ref 0–199)
CHOLEST/HDLC SERPL: 3.38 {RATIO}
EOSINOPHIL # BLD AUTO: 0.27 10*3/MM3 (ref 0–0.7)
EOSINOPHIL NFR BLD AUTO: 4.4 % (ref 0–7)
ERYTHROCYTE [DISTWIDTH] IN BLOOD BY AUTOMATED COUNT: 13.5 % (ref 11.5–14.5)
HCT VFR BLD AUTO: 41.2 % (ref 37–47)
HDLC SERPL-MCNC: 61 MG/DL (ref 40–60)
HGB BLD-MCNC: 13.8 G/DL (ref 12–16)
IMM GRANULOCYTES # BLD AUTO: 0.02 10*3/MM3 (ref 0–0.06)
IMM GRANULOCYTES NFR BLD AUTO: 0.3 % (ref 0–0.6)
LDLC SERPL CALC-MCNC: 126 MG/DL (ref 0–99)
LYMPHOCYTES # BLD AUTO: 0.95 10*3/MM3 (ref 0.6–3.4)
LYMPHOCYTES NFR BLD AUTO: 15.4 % (ref 10–50)
MCH RBC QN AUTO: 32.7 PG (ref 27–31)
MCHC RBC AUTO-ENTMCNC: 33.5 G/DL (ref 30–37)
MCV RBC AUTO: 97.6 FL (ref 81–99)
MONOCYTES # BLD AUTO: 0.59 10*3/MM3 (ref 0–0.9)
MONOCYTES NFR BLD AUTO: 9.6 % (ref 0–12)
NEUTROPHILS # BLD AUTO: 4.28 10*3/MM3 (ref 2–6.9)
NEUTROPHILS NFR BLD AUTO: 69.5 % (ref 37–80)
NRBC BLD AUTO-RTO: 0 /100 WBC (ref 0–0)
PLATELET # BLD AUTO: 338 10*3/MM3 (ref 130–400)
RBC # BLD AUTO: 4.22 10*6/MM3 (ref 4.2–5.4)
TRIGL SERPL-MCNC: 97 MG/DL
VLDLC SERPL CALC-MCNC: 19.4 MG/DL
WBC # BLD AUTO: 6.16 10*3/MM3 (ref 4.8–10.8)

## 2019-02-20 PROCEDURE — 90732 PPSV23 VACC 2 YRS+ SUBQ/IM: CPT | Performed by: INTERNAL MEDICINE

## 2019-02-20 PROCEDURE — 99396 PREV VISIT EST AGE 40-64: CPT | Performed by: INTERNAL MEDICINE

## 2019-02-20 PROCEDURE — 90471 IMMUNIZATION ADMIN: CPT | Performed by: INTERNAL MEDICINE

## 2019-02-20 NOTE — PROGRESS NOTES
Chief Complaint   Patient presents with   • Annual Exam     Physical only.      Subjective   Sandra Guerrero is a 63 y.o. female.     Here today for annual PE.   PMH of HTN, asthma, hypothyroid, GERD, DJD.   SH: She does not drink or smoke.  She has never been  and does not have any children.  She has never had a Pap and declines to have one done.   HCM- Mammogram is UTD.  Tdap, Hep A #1 and flu vaccines are UTD.  She declines colonoscopy, no FH of colon cancer.   HTN- well controlled today, she does not check it.  Norvasc was added to losartan last month.  She has noted some trace edema. No CP, SOA or palpitations.    Asthma- she rarely has to use her albuterol.  She does not need her asmanex either.  Hypothyroid- she has been on medication at same dose for about 8 yrs.  She had hyperthyroid disease and was treated, then placed on synthroid.  Takes it on empty stomach daily  GERD- she takes TUMS as needed.  Only takes zantac as needed.  She sees Dr Aldrich ENT at [facility] and he monitors her GERD  DJD- she has diffuse DJD in her back, hands, feet, shoulders and neck.  She does not take any thing for it.    She does not see the eye doctor regulary.   She sees the dentist every 6 mo.  She does wear her seat belt regularly.  She does not text and drive.          The following portions of the patient's history were reviewed and updated as appropriate: allergies, current medications, past family history, past medical history, past social history, past surgical history and problem list.    Review of Systems   Constitutional: Negative for activity change, appetite change and unexpected weight change.   HENT: Negative for trouble swallowing and voice change.    Eyes: Negative for visual disturbance.   Respiratory: Negative for shortness of breath.    Cardiovascular: Positive for leg swelling. Negative for chest pain and palpitations.   Gastrointestinal: Negative for abdominal pain.   Endocrine: Positive for cold  "intolerance. Negative for heat intolerance.   Musculoskeletal: Positive for arthralgias.   Allergic/Immunologic: Negative for environmental allergies.   Neurological: Negative for headaches.   Psychiatric/Behavioral: Negative for dysphoric mood and sleep disturbance. The patient is not nervous/anxious.    All other systems reviewed and are negative.      Objective   /70   Pulse 71   Temp 97.9 °F (36.6 °C)   Ht 165.1 cm (65\")   Wt 106 kg (233 lb)   SpO2 98%   BMI 38.77 kg/m²   Body mass index is 38.77 kg/m².  Physical Exam   Constitutional: She is oriented to person, place, and time. She appears well-developed and well-nourished. No distress.   Pleasant female in no distress today, obese   HENT:   Head: Normocephalic and atraumatic.   Right Ear: External ear normal.   Left Ear: External ear normal.   Mouth/Throat: Oropharynx is clear and moist.   Eyes: Conjunctivae and EOM are normal. Pupils are equal, round, and reactive to light.   Neck: Normal range of motion. Neck supple. No thyromegaly present.   Cardiovascular: Normal rate, regular rhythm, normal heart sounds and intact distal pulses.   No murmur heard.  No carotid bruits   Pulmonary/Chest: Effort normal and breath sounds normal. No respiratory distress. She has no wheezes.   Abdominal: Soft. Bowel sounds are normal. She exhibits no distension. There is no tenderness.   Musculoskeletal: Normal range of motion. She exhibits no edema.   Lymphadenopathy:     She has no cervical adenopathy.   Neurological: She is alert and oriented to person, place, and time. No cranial nerve deficit. Coordination normal.   Psychiatric: She has a normal mood and affect. Her behavior is normal. Judgment and thought content normal.   Nursing note and vitals reviewed.      Assessment/Plan   Sandra Guerrero is here today and the following problems have been addressed:      Sandra was seen today for annual exam.    Diagnoses and all orders for this visit:    Physical exam  -   "   CBC & Differential  -     Lipid Panel With / Chol / HDL Ratio  -     Vitamin D 25 Hydroxy    Essential hypertension  -     CBC & Differential  -     Lipid Panel With / Chol / HDL Ratio    Gastroesophageal reflux disease, esophagitis presence not specified    Hypothyroidism (acquired)    Mild intermittent asthma without complication    Screening for colon cancer  -     Cologuard - Stool, Per Rectum; Future    Labs as noted  Recommend avoidance of processed foods  Exercise for 30 minutes 5 days a week as tolerated  Recommend annual eye doctor appointment, or as necessary  See your dentist twice a year.  Recommend that you brush teeth twice daily and floss minimum of once daily  Recommend regular seatbelt use  Do not text or use phone while driving  Pneumovax 23 given today  Hep A #2 due next visit   cologuard ordered for colon cancer screening  Patient declines colonoscopy, breast exam and Pap  Recommend vitamin D 1000 units daily to help maintain bone density    Return to clinic in 6 months for routine follow-up        Please note that portions of this note were completed with a voice recognition program.  Efforts were made to edit dictation, but occasionally words are mistranscribed.

## 2019-06-27 ENCOUNTER — TELEPHONE (OUTPATIENT)
Dept: INTERNAL MEDICINE | Facility: CLINIC | Age: 64
End: 2019-06-27

## 2019-06-27 DIAGNOSIS — E03.9 HYPOTHYROIDISM (ACQUIRED): ICD-10-CM

## 2019-06-27 DIAGNOSIS — J45.20 MILD INTERMITTENT ASTHMA WITHOUT COMPLICATION: ICD-10-CM

## 2019-06-27 DIAGNOSIS — I10 ESSENTIAL HYPERTENSION: ICD-10-CM

## 2019-06-27 RX ORDER — LOSARTAN POTASSIUM 50 MG/1
75 TABLET ORAL DAILY
Qty: 45 TABLET | Refills: 5 | Status: SHIPPED | OUTPATIENT
Start: 2019-06-27 | End: 2020-02-14

## 2019-06-27 RX ORDER — LEVOTHYROXINE SODIUM 137 UG/1
137 TABLET ORAL DAILY
Qty: 30 TABLET | Refills: 5 | Status: SHIPPED | OUTPATIENT
Start: 2019-06-27 | End: 2019-12-20

## 2019-06-27 RX ORDER — AMLODIPINE BESYLATE 5 MG/1
5 TABLET ORAL DAILY
Qty: 30 TABLET | Refills: 5 | Status: SHIPPED | OUTPATIENT
Start: 2019-06-27 | End: 2020-01-15

## 2019-06-27 NOTE — TELEPHONE ENCOUNTER
Patient called and states she received a voicemail that she did not need to keep her appointment tommrow for medication refills since she was due until august. She states she was fine with this and wanted to make sure her medications got sent in. She states she needs refills on all her medications but did not have the names with her. Please advise.

## 2019-07-03 ENCOUNTER — OFFICE VISIT (OUTPATIENT)
Dept: INTERNAL MEDICINE | Facility: CLINIC | Age: 64
End: 2019-07-03

## 2019-07-03 VITALS
HEART RATE: 68 BPM | OXYGEN SATURATION: 98 % | SYSTOLIC BLOOD PRESSURE: 138 MMHG | WEIGHT: 235 LBS | HEIGHT: 65 IN | TEMPERATURE: 96.4 F | BODY MASS INDEX: 39.15 KG/M2 | RESPIRATION RATE: 18 BRPM | DIASTOLIC BLOOD PRESSURE: 98 MMHG

## 2019-07-03 DIAGNOSIS — L24.7 IRRITANT CONTACT DERMATITIS DUE TO PLANTS, EXCEPT FOOD: Primary | ICD-10-CM

## 2019-07-03 PROCEDURE — 99213 OFFICE O/P EST LOW 20 MIN: CPT | Performed by: PHYSICIAN ASSISTANT

## 2019-07-03 RX ORDER — METHYLPREDNISOLONE 4 MG/1
TABLET ORAL
Qty: 1 EACH | Refills: 0 | Status: SHIPPED | OUTPATIENT
Start: 2019-07-03 | End: 2019-08-23

## 2019-07-03 NOTE — PROGRESS NOTES
Chief Complaint   Patient presents with   • Poison Ivy     Patient states she was working in her yard on 06/29/19 and noticed on 06/30/19 blisters and the poison ivy starting on her right hand and going up her arm and now to her left arm, face, neck, and rib cage.       Subjective   Sandra Guerrero is a 63 y.o. female    History of Present Illness     Saturday patient was working in her yard and burning brush.  Sunday evening she noted a rash to the right forearm.  This rash has continued to spread.  She has noted it has spread to her left hand, left forearm, right wrist and forearm, rib cage, and under the chin.  She has been using calamine lotion for symptom relief.  She has also taken benadryl at night, which has not helped the symptoms.      Past Medical History:   Diagnosis Date   • Adverse effect of drug/medicinal     Adverse effect of drug, medicament, or biological substance   • Allergic rhinitis    • Elevated blood pressure reading without diagnosis of hypertension    • History of abnormal mammogram      History reviewed. No pertinent surgical history.  Family History   Problem Relation Age of Onset   • Hypertension Other    • Migraines Other    • Obesity Other    • Arthritis Other    • Thyroid disease Other    • Stroke Other         CVA   • Cancer Other         malignant neoplasm   • Breast cancer Mother      Social History     Socioeconomic History   • Marital status:      Spouse name: Not on file   • Number of children: Not on file   • Years of education: Not on file   • Highest education level: Not on file   Tobacco Use   • Smoking status: Never Smoker   • Smokeless tobacco: Never Used   Substance and Sexual Activity   • Alcohol use: No   • Drug use: No     No Known Allergies      Review of Systems   Constitutional: Negative for activity change, appetite change, chills and fever.   Skin: Positive for rash.     Objective     Vitals:    07/03/19 1709   BP: 138/98   Pulse: 68   Resp: 18   Temp: 96.4  °F (35.8 °C)   SpO2: 98%       Physical Exam   Constitutional: She is oriented to person, place, and time. She appears well-developed and well-nourished. No distress.   HENT:   Head: Normocephalic and atraumatic.   Eyes: Conjunctivae and EOM are normal. Pupils are equal, round, and reactive to light.   Neck: Normal range of motion. Neck supple.   Cardiovascular: Normal rate, regular rhythm, normal heart sounds and intact distal pulses. Exam reveals no gallop and no friction rub.   No murmur heard.  Pulmonary/Chest: Effort normal and breath sounds normal. No respiratory distress. She has no wheezes. She has no rales.   Neurological: She is alert and oriented to person, place, and time.   Skin: Skin is warm and dry. Capillary refill takes less than 2 seconds. Rash noted. Rash is urticarial. She is not diaphoretic.        Left forearm lesion is weeping clear fluid.  Multiple areas of blister like lesions noted diffusely over the body   Psychiatric: She has a normal mood and affect. Her behavior is normal.   Nursing note and vitals reviewed.      Assessment/Plan     Sandra was seen today for poison ivy.    Diagnoses and all orders for this visit:    Irritant contact dermatitis due to plants, except food  -     methylPREDNISolone (MEDROL, RADHA,) 4 MG tablet; Take as directed on package instructions.    Start medrol dose pack.  Continue calamine lotion.  Can use benadryl at night and zyrtec during the day.  Cool compresses for additional relief.  Recommend patient wash all clothing or other soft surfaces she came in contact with the day she was preforming yard work.  Watch areas for any symptoms or signs of infection.  If symptoms are not improving or if they worsen RTC.  Patient verbalizes understanding and is in agreement of plan.     Return if symptoms worsen or fail to improve.    Savita Camacho PA-C

## 2019-08-23 ENCOUNTER — OFFICE VISIT (OUTPATIENT)
Dept: INTERNAL MEDICINE | Facility: CLINIC | Age: 64
End: 2019-08-23

## 2019-08-23 VITALS
WEIGHT: 233 LBS | HEIGHT: 65 IN | DIASTOLIC BLOOD PRESSURE: 82 MMHG | SYSTOLIC BLOOD PRESSURE: 136 MMHG | HEART RATE: 91 BPM | OXYGEN SATURATION: 98 % | BODY MASS INDEX: 38.82 KG/M2 | TEMPERATURE: 98.6 F

## 2019-08-23 DIAGNOSIS — M19.90 ARTHRITIS: ICD-10-CM

## 2019-08-23 DIAGNOSIS — J45.20 MILD INTERMITTENT ASTHMA WITHOUT COMPLICATION: ICD-10-CM

## 2019-08-23 DIAGNOSIS — K21.9 GASTROESOPHAGEAL REFLUX DISEASE, ESOPHAGITIS PRESENCE NOT SPECIFIED: ICD-10-CM

## 2019-08-23 DIAGNOSIS — E03.9 HYPOTHYROIDISM (ACQUIRED): ICD-10-CM

## 2019-08-23 DIAGNOSIS — I10 ESSENTIAL HYPERTENSION: Primary | ICD-10-CM

## 2019-08-23 PROCEDURE — 90632 HEPA VACCINE ADULT IM: CPT | Performed by: INTERNAL MEDICINE

## 2019-08-23 PROCEDURE — 90471 IMMUNIZATION ADMIN: CPT | Performed by: INTERNAL MEDICINE

## 2019-08-23 PROCEDURE — 99214 OFFICE O/P EST MOD 30 MIN: CPT | Performed by: INTERNAL MEDICINE

## 2019-08-23 NOTE — PROGRESS NOTES
Chief Complaint   Patient presents with   • Follow-up     for HTN and Hypothyroidism.      Subjective   Sandra Guerrero is a 63 y.o. female.     Here today for follow-up of HTN, asthma, hypothyroid, GERD and DJD.  HTN- well controlled today, she does not check it.  No CP, SOA or palpitations or edema  Asthma- she has had a few bouts of asthma with associated URIs.  She used her albuterol and it was helpful.  Her allergies have been bothersome, she uses nasonex daily and uses benadryl prn  Hypothyroid- Takes medication on empty stomach daily, no symptoms of constipation, difficulty swallowing, change in voice or hair loss.  Weight remains stable  GERD- she takes TUMS as needed.  DJD- she has diffuse DJD in her hands, knee and ankle.  She takes turmeric, it helps some, uses advil if needed  HCM-mammogram is due October.  Cologuard was ordered 6 months ago during physical exam and remains pending.  She had first hepatitis A vaccine in January. She has not had shingrix vaccine.  She declines pap.          The following portions of the patient's history were reviewed and updated as appropriate: allergies, current medications, past family history, past medical history, past social history, past surgical history and problem list.    Review of Systems   Constitutional: Negative for activity change, appetite change and unexpected weight change.   HENT: Negative for trouble swallowing and voice change.    Eyes: Negative for visual disturbance.   Respiratory: Negative for shortness of breath.    Cardiovascular: Negative for chest pain, palpitations and leg swelling.   Gastrointestinal: Negative for abdominal pain.   Endocrine: Negative for cold intolerance and heat intolerance.   Musculoskeletal: Positive for arthralgias.   Allergic/Immunologic: Positive for environmental allergies.   Neurological: Negative for headaches.   Psychiatric/Behavioral: Negative for dysphoric mood and sleep disturbance. The patient is not  "nervous/anxious.        Objective   /82   Pulse 91   Temp 98.6 °F (37 °C)   Ht 165.1 cm (65\")   Wt 106 kg (233 lb)   SpO2 98%   BMI 38.77 kg/m²   Body mass index is 38.77 kg/m².  Physical Exam   Constitutional: She is oriented to person, place, and time. She appears well-developed and well-nourished. No distress.   Very pleasant female who appears her stated age, she is in no distress today   HENT:   Head: Normocephalic and atraumatic.   Right Ear: External ear normal.   Left Ear: External ear normal.   Mouth/Throat: Oropharynx is clear and moist.   Tympanic membranes normal, turbinates pale and boggy with clear rhinorrhea, clear postnasal drip noted   Eyes: Conjunctivae and EOM are normal. Pupils are equal, round, and reactive to light.   Neck: Normal range of motion. Neck supple. No thyromegaly present.   Cardiovascular: Normal rate, regular rhythm, normal heart sounds and intact distal pulses.   No murmur heard.  No carotid bruits   Pulmonary/Chest: Effort normal and breath sounds normal. No respiratory distress. She has no wheezes.   Abdominal: Soft. Bowel sounds are normal. She exhibits no distension. There is no tenderness.   Musculoskeletal: Normal range of motion. She exhibits no edema.   Lymphadenopathy:     She has no cervical adenopathy.   Neurological: She is alert and oriented to person, place, and time. No cranial nerve deficit. Coordination normal.   Psychiatric: She has a normal mood and affect. Her behavior is normal. Judgment and thought content normal.   Nursing note and vitals reviewed.      Assessment/Plan   Sandra Guerrero is here today and the following problems have been addressed:      Sandra was seen today for follow-up.    Diagnoses and all orders for this visit:    Essential hypertension    Hypothyroidism (acquired)    Gastroesophageal reflux disease, esophagitis presence not specified    Arthritis    Mild intermittent asthma without complication        Follow heart healthy/low " salt diet  Avoid processed foods  Monitor blood pressure as discussed  Exercise as tolerated up to 150 minutes per week  Take all medications as prescribed  She was given Hep A #2 today  Labs are UTD  She states she turned in her cologuard 5 mo ago and results are not in chart, we will try to track down results  Continue PRN albuterol for asthma flares  Continue Tums as needed for GERD symptoms  Continue turmeric and IBU prn for DJD sxs    Return in about 6 months (around 2/23/2020) for Annual.      Marilyn K. Vermeesch, MD      Please note that portions of this note were completed with a voice recognition program.  Efforts were made to edit dictation, but occasionally words are mistranscribed.

## 2019-08-28 ENCOUNTER — TELEPHONE (OUTPATIENT)
Dept: INTERNAL MEDICINE | Facility: CLINIC | Age: 64
End: 2019-08-28

## 2019-08-28 NOTE — TELEPHONE ENCOUNTER
When Pt was last here in office she advised Dr. Vermeesch that she sent her cologuard in but hasn't heard anything. We also haven't received a result. Nidhi looked Pt up in [employer] portal and seen that Pt gave too much of a sample and they were unable to process it.     I left Pt VM informing her of this and left the customer support number for cologuard. [phone]

## 2019-12-20 DIAGNOSIS — E03.9 HYPOTHYROIDISM (ACQUIRED): ICD-10-CM

## 2019-12-20 RX ORDER — LEVOTHYROXINE SODIUM 137 UG/1
TABLET ORAL
Qty: 30 TABLET | Refills: 5 | Status: SHIPPED | OUTPATIENT
Start: 2019-12-20 | End: 2020-06-03

## 2020-01-15 DIAGNOSIS — I10 ESSENTIAL HYPERTENSION: ICD-10-CM

## 2020-01-15 RX ORDER — AMLODIPINE BESYLATE 5 MG/1
TABLET ORAL
Qty: 30 TABLET | Refills: 0 | Status: SHIPPED | OUTPATIENT
Start: 2020-01-15 | End: 2020-02-14

## 2020-02-14 DIAGNOSIS — I10 ESSENTIAL HYPERTENSION: ICD-10-CM

## 2020-02-14 RX ORDER — LOSARTAN POTASSIUM 50 MG/1
TABLET ORAL
Qty: 45 TABLET | Refills: 0 | Status: SHIPPED | OUTPATIENT
Start: 2020-02-14 | End: 2020-03-16

## 2020-02-14 RX ORDER — AMLODIPINE BESYLATE 5 MG/1
TABLET ORAL
Qty: 30 TABLET | Refills: 0 | Status: SHIPPED | OUTPATIENT
Start: 2020-02-14 | End: 2020-02-24

## 2020-02-21 ENCOUNTER — OFFICE VISIT (OUTPATIENT)
Dept: INTERNAL MEDICINE | Facility: CLINIC | Age: 65
End: 2020-02-21

## 2020-02-21 VITALS
WEIGHT: 235 LBS | TEMPERATURE: 97.9 F | HEIGHT: 65 IN | DIASTOLIC BLOOD PRESSURE: 80 MMHG | HEART RATE: 68 BPM | SYSTOLIC BLOOD PRESSURE: 128 MMHG | OXYGEN SATURATION: 96 % | BODY MASS INDEX: 39.15 KG/M2

## 2020-02-21 DIAGNOSIS — Z00.00 PHYSICAL EXAM: Primary | ICD-10-CM

## 2020-02-21 DIAGNOSIS — J45.20 MILD INTERMITTENT ASTHMA WITHOUT COMPLICATION: ICD-10-CM

## 2020-02-21 DIAGNOSIS — E03.9 HYPOTHYROIDISM (ACQUIRED): ICD-10-CM

## 2020-02-21 DIAGNOSIS — K21.9 GASTROESOPHAGEAL REFLUX DISEASE, ESOPHAGITIS PRESENCE NOT SPECIFIED: ICD-10-CM

## 2020-02-21 DIAGNOSIS — I10 ESSENTIAL HYPERTENSION: ICD-10-CM

## 2020-02-21 PROCEDURE — 99396 PREV VISIT EST AGE 40-64: CPT | Performed by: INTERNAL MEDICINE

## 2020-02-21 NOTE — PROGRESS NOTES
Chief Complaint   Patient presents with   • Annual Exam     Physical only.      Subjective   Sandra Guerrero is a 64 y.o. female.     Here today for annual physical exam.  PMH of HTN, chronic bronchitis/asthma, GERD, hypothyroidism, DJD.  HTN-  Her BP is well controlled, she takes her med regularly.  No CP, palpitation, SOA.  She does have edema in left leg, hurt this ankle in the past  Asthma/chronic bronchitis-  She has not had any issues over the winter months, no albuterol use  GERD- she avoids her triggers and uses TUMS as needed  Hypothyroidism- she takes her thyroid med on empty stomach.  No constipation, depression, swallow problems  DJD- most bothersome in right knee and hand.  She does not take any OTC meds, uses advil prn  HCM-mammogram past due-she will order it on her own, says it is always abnormal.   Flu shot is up-to-date.  She has had Pneumovax 23.  Hepatitis A series is complete.  She did a cologuard test and had to repeat it and we never got a result.   She does not exercise.  She does not eat healthy, she does bring her lunch to work. She does try to eat at home more often, eats out about twice a week.   Last eye exam a week ago and was normal.  Goes to dentist twice a yr.   She wears her seatbelt.  She does not text and drive.          The following portions of the patient's history were reviewed and updated as appropriate: allergies, current medications, past family history, past medical history, past social history, past surgical history and problem list.    Review of Systems   Constitutional: Negative for activity change, appetite change and unexpected weight change.   Eyes: Negative for visual disturbance.   Respiratory: Negative for shortness of breath.    Cardiovascular: Positive for leg swelling. Negative for chest pain and palpitations.   Gastrointestinal: Negative for abdominal pain.   Musculoskeletal: Positive for arthralgias.   Neurological: Negative for headaches.  "  Psychiatric/Behavioral: Negative for dysphoric mood and sleep disturbance. The patient is not nervous/anxious.        Objective   /80   Pulse 68   Temp 97.9 °F (36.6 °C)   Ht 165.1 cm (65\")   Wt 107 kg (235 lb)   SpO2 96%   BMI 39.11 kg/m²   Body mass index is 39.11 kg/m².  Physical Exam   Constitutional: She is oriented to person, place, and time. She appears well-developed and well-nourished. No distress.   Pleasant female, appears her stated age, no distress today   HENT:   Head: Normocephalic and atraumatic.   Right Ear: External ear normal.   Left Ear: External ear normal.   Mouth/Throat: Oropharynx is clear and moist.   TMs normal, oropharynx clear   Eyes: Pupils are equal, round, and reactive to light. Conjunctivae and EOM are normal.   Neck: Normal range of motion. Neck supple. No thyromegaly present.   Cardiovascular: Normal rate, regular rhythm, normal heart sounds and intact distal pulses.   No murmur heard.  No carotid bruits   Pulmonary/Chest: Effort normal and breath sounds normal. No respiratory distress. She has no wheezes.   Abdominal: Soft. Bowel sounds are normal. She exhibits no distension. There is no tenderness.   Musculoskeletal: Normal range of motion. She exhibits edema.   Trace edema noted at ankles   Lymphadenopathy:     She has no cervical adenopathy.   Neurological: She is alert and oriented to person, place, and time. No cranial nerve deficit. Coordination normal.   Psychiatric: She has a normal mood and affect. Her behavior is normal. Judgment and thought content normal.   Nursing note and vitals reviewed.      Assessment/Plan   Sandra Guerrero is here today and the following problems have been addressed:      Sandra was seen today for annual exam.    Diagnoses and all orders for this visit:    Physical exam  -     CBC & Differential  -     Comprehensive Metabolic Panel  -     Lipid Panel With / Chol / HDL Ratio  -     TSH  -     T4, Free    Essential hypertension  -     " CBC & Differential  -     Comprehensive Metabolic Panel  -     Lipid Panel With / Chol / HDL Ratio    Hypothyroidism (acquired)  -     TSH  -     T4, Free    Gastroesophageal reflux disease, esophagitis presence not specified  -     CBC & Differential    Mild intermittent asthma without complication    Labs as noted  Recommend avoidance of processed foods  Avoid salt, follow low-fat/low-cholesterol diet-monitor portion sizes to help with weight management  Continue current dose of thyroid medication, will adjust dose if necessary based on lab results  No current GERD symptoms  Her asthma has been well controlled, she has no need for albuterol at this time  Exercise for 30 minutes 5 days a week as tolerated  Recommend annual eye doctor appointment, or as necessary  See your dentist twice a year.  Recommend that you brush teeth twice daily and floss minimum of once daily  Recommend regular seatbelt use  Do not text or use phone while driving  Recommend CBD oil for her DJD pain and aleve if she needs it  Will check on her cologuard result  She will schedule her mammogram      RTC 6 mo          Please note that portions of this note were completed with a voice recognition program.  Efforts were made to edit dictation, but occasionally words are mistranscribed.

## 2020-02-22 DIAGNOSIS — I10 ESSENTIAL HYPERTENSION: ICD-10-CM

## 2020-02-22 LAB
ALBUMIN SERPL-MCNC: 4.2 G/DL (ref 3.5–5.2)
ALBUMIN/GLOB SERPL: 1.4 G/DL
ALP SERPL-CCNC: 79 U/L (ref 39–117)
ALT SERPL-CCNC: 16 U/L (ref 1–33)
AST SERPL-CCNC: 15 U/L (ref 1–32)
BASOPHILS # BLD AUTO: 0.05 10*3/MM3 (ref 0–0.2)
BASOPHILS NFR BLD AUTO: 0.7 % (ref 0–1.5)
BILIRUB SERPL-MCNC: 0.3 MG/DL (ref 0.2–1.2)
BUN SERPL-MCNC: 17 MG/DL (ref 8–23)
BUN/CREAT SERPL: 18.9 (ref 7–25)
CALCIUM SERPL-MCNC: 9.5 MG/DL (ref 8.6–10.5)
CHLORIDE SERPL-SCNC: 99 MMOL/L (ref 98–107)
CHOLEST SERPL-MCNC: 222 MG/DL (ref 0–200)
CHOLEST/HDLC SERPL: 3.64 {RATIO}
CO2 SERPL-SCNC: 27.1 MMOL/L (ref 22–29)
CREAT SERPL-MCNC: 0.9 MG/DL (ref 0.57–1)
EOSINOPHIL # BLD AUTO: 0.39 10*3/MM3 (ref 0–0.4)
EOSINOPHIL NFR BLD AUTO: 5.4 % (ref 0.3–6.2)
ERYTHROCYTE [DISTWIDTH] IN BLOOD BY AUTOMATED COUNT: 13.1 % (ref 12.3–15.4)
GLOBULIN SER CALC-MCNC: 2.9 GM/DL
GLUCOSE SERPL-MCNC: 92 MG/DL (ref 65–99)
HCT VFR BLD AUTO: 40.2 % (ref 34–46.6)
HDLC SERPL-MCNC: 61 MG/DL (ref 40–60)
HGB BLD-MCNC: 13.5 G/DL (ref 12–15.9)
IMM GRANULOCYTES # BLD AUTO: 0.02 10*3/MM3 (ref 0–0.05)
IMM GRANULOCYTES NFR BLD AUTO: 0.3 % (ref 0–0.5)
LDLC SERPL CALC-MCNC: 140 MG/DL (ref 0–100)
LYMPHOCYTES # BLD AUTO: 1.51 10*3/MM3 (ref 0.7–3.1)
LYMPHOCYTES NFR BLD AUTO: 21 % (ref 19.6–45.3)
MCH RBC QN AUTO: 31.3 PG (ref 26.6–33)
MCHC RBC AUTO-ENTMCNC: 33.6 G/DL (ref 31.5–35.7)
MCV RBC AUTO: 93.1 FL (ref 79–97)
MONOCYTES # BLD AUTO: 0.74 10*3/MM3 (ref 0.1–0.9)
MONOCYTES NFR BLD AUTO: 10.3 % (ref 5–12)
NEUTROPHILS # BLD AUTO: 4.49 10*3/MM3 (ref 1.7–7)
NEUTROPHILS NFR BLD AUTO: 62.3 % (ref 42.7–76)
NRBC BLD AUTO-RTO: 0 /100 WBC (ref 0–0.2)
PLATELET # BLD AUTO: 347 10*3/MM3 (ref 140–450)
POTASSIUM SERPL-SCNC: 4.3 MMOL/L (ref 3.5–5.2)
PROT SERPL-MCNC: 7.1 G/DL (ref 6–8.5)
RBC # BLD AUTO: 4.32 10*6/MM3 (ref 3.77–5.28)
SODIUM SERPL-SCNC: 137 MMOL/L (ref 136–145)
T4 FREE SERPL-MCNC: 1.92 NG/DL (ref 0.93–1.7)
TRIGL SERPL-MCNC: 107 MG/DL (ref 0–150)
TSH SERPL DL<=0.005 MIU/L-ACNC: 1.49 UIU/ML (ref 0.27–4.2)
VLDLC SERPL CALC-MCNC: 21.4 MG/DL
WBC # BLD AUTO: 7.2 10*3/MM3 (ref 3.4–10.8)

## 2020-02-24 RX ORDER — AMLODIPINE BESYLATE 5 MG/1
TABLET ORAL
Qty: 30 TABLET | Refills: 0 | Status: SHIPPED | OUTPATIENT
Start: 2020-02-24 | End: 2020-04-10

## 2020-03-16 DIAGNOSIS — I10 ESSENTIAL HYPERTENSION: ICD-10-CM

## 2020-03-16 RX ORDER — LOSARTAN POTASSIUM 50 MG/1
TABLET ORAL
Qty: 45 TABLET | Refills: 5 | Status: SHIPPED | OUTPATIENT
Start: 2020-03-16 | End: 2020-09-24 | Stop reason: SDUPTHER

## 2020-04-10 DIAGNOSIS — I10 ESSENTIAL HYPERTENSION: ICD-10-CM

## 2020-04-10 RX ORDER — AMLODIPINE BESYLATE 5 MG/1
TABLET ORAL
Qty: 90 TABLET | Refills: 0 | Status: SHIPPED | OUTPATIENT
Start: 2020-04-10 | End: 2020-05-11

## 2020-05-09 DIAGNOSIS — I10 ESSENTIAL HYPERTENSION: ICD-10-CM

## 2020-05-11 RX ORDER — AMLODIPINE BESYLATE 5 MG/1
TABLET ORAL
Qty: 30 TABLET | Refills: 5 | Status: SHIPPED | OUTPATIENT
Start: 2020-05-11 | End: 2020-09-24 | Stop reason: SDUPTHER

## 2020-06-03 DIAGNOSIS — E03.9 HYPOTHYROIDISM (ACQUIRED): ICD-10-CM

## 2020-06-03 RX ORDER — LEVOTHYROXINE SODIUM 137 UG/1
TABLET ORAL
Qty: 30 TABLET | Refills: 6 | Status: SHIPPED | OUTPATIENT
Start: 2020-06-03 | End: 2020-09-24 | Stop reason: SDUPTHER

## 2020-09-24 ENCOUNTER — RESULTS ENCOUNTER (OUTPATIENT)
Dept: INTERNAL MEDICINE | Facility: CLINIC | Age: 65
End: 2020-09-24

## 2020-09-24 ENCOUNTER — OFFICE VISIT (OUTPATIENT)
Dept: INTERNAL MEDICINE | Facility: CLINIC | Age: 65
End: 2020-09-24

## 2020-09-24 VITALS
TEMPERATURE: 97 F | HEART RATE: 80 BPM | HEIGHT: 65 IN | DIASTOLIC BLOOD PRESSURE: 78 MMHG | OXYGEN SATURATION: 97 % | SYSTOLIC BLOOD PRESSURE: 126 MMHG | WEIGHT: 235 LBS | BODY MASS INDEX: 39.15 KG/M2

## 2020-09-24 DIAGNOSIS — E03.9 HYPOTHYROIDISM (ACQUIRED): ICD-10-CM

## 2020-09-24 DIAGNOSIS — I10 ESSENTIAL HYPERTENSION: ICD-10-CM

## 2020-09-24 DIAGNOSIS — Z12.31 SCREENING MAMMOGRAM, ENCOUNTER FOR: ICD-10-CM

## 2020-09-24 DIAGNOSIS — Z12.11 SCREENING FOR COLON CANCER: ICD-10-CM

## 2020-09-24 DIAGNOSIS — Z12.11 SCREENING FOR COLON CANCER: Primary | ICD-10-CM

## 2020-09-24 PROCEDURE — 90686 IIV4 VACC NO PRSV 0.5 ML IM: CPT | Performed by: INTERNAL MEDICINE

## 2020-09-24 PROCEDURE — 99214 OFFICE O/P EST MOD 30 MIN: CPT | Performed by: INTERNAL MEDICINE

## 2020-09-24 PROCEDURE — 90471 IMMUNIZATION ADMIN: CPT | Performed by: INTERNAL MEDICINE

## 2020-09-24 RX ORDER — AMLODIPINE BESYLATE 5 MG/1
5 TABLET ORAL DAILY
Qty: 30 TABLET | Refills: 5 | Status: SHIPPED | OUTPATIENT
Start: 2020-09-24 | End: 2020-09-24

## 2020-09-24 RX ORDER — AMLODIPINE BESYLATE 5 MG/1
2.5 TABLET ORAL DAILY
Qty: 30 TABLET | Refills: 5
Start: 2020-09-24 | End: 2021-03-22 | Stop reason: SDUPTHER

## 2020-09-24 RX ORDER — LOSARTAN POTASSIUM 50 MG/1
75 TABLET ORAL DAILY
Qty: 45 TABLET | Refills: 5 | Status: SHIPPED | OUTPATIENT
Start: 2020-09-24 | End: 2021-03-02

## 2020-09-24 RX ORDER — LEVOTHYROXINE SODIUM 137 UG/1
137 TABLET ORAL DAILY
Qty: 30 TABLET | Refills: 6 | Status: SHIPPED | OUTPATIENT
Start: 2020-09-24 | End: 2021-03-22 | Stop reason: SDUPTHER

## 2020-09-24 NOTE — PROGRESS NOTES
Chief Complaint   Patient presents with   • Follow-up     for HTN and hypothyroidism.     Subjective   Sandra Guerrero is a 65 y.o. female.     Here today for follow up of HTN, chronic bronchitis/asthma, GERD, hypothyroidism, DJD.  HTN-  Her BP is well controlled, she takes her med regularly.  No CP, palpitation, SOA or edema.  She has been cutting her amlodipine in half and says she has no edema.  She does not check her blood pressure, however, it is well controlled here in clinic.  Asthma/chronic bronchitis-  She has not had any issues with wheezing, cough or shortness of breath  GERD- she avoids her triggers and uses TUMS as needed  Hypothyroidism- she takes her thyroid med on empty stomach.  No constipation, depression, swallow problems, voice changes or hair loss  DJD- most bothersome in hands.  She does not take any OTC meds regularly, uses aleve prn  HCM- last mammogram 2018.  She has not had a colonoscopy and declines to do so-she says she did a cologuard but we never got results.  She has had Hep A series and P23. She has never had a pap and does not want one.       The following portions of the patient's history were reviewed and updated as appropriate: allergies, current medications, past family history, past medical history, past social history, past surgical history and problem list.    Review of Systems   Constitutional: Negative for activity change, appetite change and unexpected weight change.   HENT: Negative for trouble swallowing and voice change.    Eyes: Negative for visual disturbance.   Respiratory: Negative for shortness of breath and wheezing.    Cardiovascular: Negative for chest pain, palpitations and leg swelling.   Gastrointestinal: Negative for abdominal pain and constipation.   Endocrine: Negative for cold intolerance and heat intolerance.   Allergic/Immunologic: Negative for environmental allergies.   Neurological: Negative for headaches.   Psychiatric/Behavioral: Negative for  "dysphoric mood.       Objective   /78   Pulse 80   Temp 97 °F (36.1 °C)   Ht 165.1 cm (65\")   Wt 107 kg (235 lb)   SpO2 97%   BMI 39.11 kg/m²   Body mass index is 39.11 kg/m².  Physical Exam  Vitals signs and nursing note reviewed.   Constitutional:       General: She is not in acute distress.     Appearance: Normal appearance. She is well-developed. She is obese. She is not ill-appearing.      Comments: Pleasant female, appears her age, wearing a mask and in no distress today   HENT:      Head: Normocephalic and atraumatic.      Right Ear: External ear normal.      Left Ear: External ear normal.   Eyes:      General:         Right eye: No discharge.         Left eye: No discharge.      Extraocular Movements: Extraocular movements intact.      Conjunctiva/sclera: Conjunctivae normal.      Pupils: Pupils are equal, round, and reactive to light.   Neck:      Musculoskeletal: Normal range of motion and neck supple.      Thyroid: No thyromegaly.   Cardiovascular:      Rate and Rhythm: Normal rate and regular rhythm.      Heart sounds: Normal heart sounds. No murmur.      Comments: No carotid bruits  Pulmonary:      Effort: Pulmonary effort is normal. No respiratory distress.      Breath sounds: Normal breath sounds. No wheezing.   Abdominal:      General: Bowel sounds are normal. There is no distension.      Palpations: Abdomen is soft.      Tenderness: There is no abdominal tenderness.   Musculoskeletal: Normal range of motion.      Right lower leg: No edema.      Left lower leg: No edema.   Lymphadenopathy:      Cervical: No cervical adenopathy.   Skin:     General: Skin is warm.      Findings: No rash.   Neurological:      General: No focal deficit present.      Mental Status: She is alert and oriented to person, place, and time.      Cranial Nerves: No cranial nerve deficit.      Coordination: Coordination normal.   Psychiatric:         Mood and Affect: Mood normal.         Behavior: Behavior normal.    "      Thought Content: Thought content normal.         Judgment: Judgment normal.         Assessment/Plan   Sandra Guerrero is here today and the following problems have been addressed:      Sandra was seen today for follow-up.    Diagnoses and all orders for this visit:    Screening for colon cancer  -     Cologuard - Stool, Per Rectum; Future    Essential hypertension  -     Discontinue: amLODIPine (NORVASC) 5 MG tablet; Take 1 tablet by mouth Daily. 200001  -     losartan (COZAAR) 50 MG tablet; Take 1.5 tablets by mouth Daily.  -     amLODIPine (NORVASC) 5 MG tablet; Take 0.5 tablets by mouth Daily. 200001    Hypothyroidism (acquired)  -     levothyroxine (SYNTHROID, LEVOTHROID) 137 MCG tablet; Take 1 tablet by mouth Daily. 200001    Screening mammogram, encounter for  -     Mammo Screening Digital Tomosynthesis Bilateral With CAD; Future    Other orders  -     Fluarix Quad >6 Months (1112-0009)        Follow heart healthy/low salt diet  Avoid processed foods  Monitor blood pressure on occasion  Exercise as tolerated  Take all medications as prescribed  Flu shot given today  Mammogram ordered  cologuard ordered  Patient declines colonoscopy and Pap      Return in about 6 months (around 3/24/2021) for Annual.      Marilyn K. Vermeesch, MD      Please note that portions of this note were completed with a voice recognition program.  Efforts were made to edit dictation, but occasionally words are mistranscribed.

## 2020-09-29 DIAGNOSIS — Z12.31 SCREENING MAMMOGRAM, ENCOUNTER FOR: Primary | ICD-10-CM

## 2020-11-04 ENCOUNTER — HOSPITAL ENCOUNTER (OUTPATIENT)
Dept: MAMMOGRAPHY | Facility: HOSPITAL | Age: 65
Discharge: HOME | End: 2020-11-04
Admitting: INTERNAL MEDICINE

## 2020-11-04 DIAGNOSIS — Z12.31 SCREENING MAMMOGRAM, ENCOUNTER FOR: ICD-10-CM

## 2020-11-04 PROCEDURE — 77067 SCR MAMMO BI INCL CAD: CPT | Performed by: RADIOLOGY

## 2020-11-04 PROCEDURE — 77067 SCR MAMMO BI INCL CAD: CPT

## 2020-11-04 PROCEDURE — 77063 BREAST TOMOSYNTHESIS BI: CPT

## 2020-11-04 PROCEDURE — 77063 BREAST TOMOSYNTHESIS BI: CPT | Performed by: RADIOLOGY

## 2021-03-02 DIAGNOSIS — I10 ESSENTIAL HYPERTENSION: ICD-10-CM

## 2021-03-02 RX ORDER — LOSARTAN POTASSIUM 50 MG/1
TABLET ORAL
Qty: 45 TABLET | Refills: 1 | Status: SHIPPED | OUTPATIENT
Start: 2021-03-02 | End: 2021-05-03

## 2021-03-22 ENCOUNTER — OFFICE VISIT (OUTPATIENT)
Dept: INTERNAL MEDICINE | Facility: CLINIC | Age: 66
End: 2021-03-22

## 2021-03-22 VITALS
SYSTOLIC BLOOD PRESSURE: 126 MMHG | TEMPERATURE: 97 F | OXYGEN SATURATION: 97 % | WEIGHT: 231 LBS | HEIGHT: 65 IN | HEART RATE: 80 BPM | DIASTOLIC BLOOD PRESSURE: 82 MMHG | BODY MASS INDEX: 38.49 KG/M2

## 2021-03-22 DIAGNOSIS — K21.9 GASTROESOPHAGEAL REFLUX DISEASE, UNSPECIFIED WHETHER ESOPHAGITIS PRESENT: ICD-10-CM

## 2021-03-22 DIAGNOSIS — Z11.59 ENCOUNTER FOR HEPATITIS C SCREENING TEST FOR LOW RISK PATIENT: ICD-10-CM

## 2021-03-22 DIAGNOSIS — I10 ESSENTIAL HYPERTENSION: ICD-10-CM

## 2021-03-22 DIAGNOSIS — Z00.00 PHYSICAL EXAM: Primary | ICD-10-CM

## 2021-03-22 DIAGNOSIS — E03.9 HYPOTHYROIDISM (ACQUIRED): ICD-10-CM

## 2021-03-22 DIAGNOSIS — E66.09 CLASS 2 OBESITY DUE TO EXCESS CALORIES WITHOUT SERIOUS COMORBIDITY WITH BODY MASS INDEX (BMI) OF 38.0 TO 38.9 IN ADULT: ICD-10-CM

## 2021-03-22 PROCEDURE — 99396 PREV VISIT EST AGE 40-64: CPT | Performed by: INTERNAL MEDICINE

## 2021-03-22 RX ORDER — LEVOTHYROXINE SODIUM 137 UG/1
137 TABLET ORAL DAILY
Qty: 30 TABLET | Refills: 6 | Status: SHIPPED | OUTPATIENT
Start: 2021-03-22 | End: 2021-08-30

## 2021-03-22 RX ORDER — AMLODIPINE BESYLATE 5 MG/1
2.5 TABLET ORAL DAILY
Qty: 30 TABLET | Refills: 5 | Status: SHIPPED | OUTPATIENT
Start: 2021-03-22 | End: 2022-04-04 | Stop reason: SDUPTHER

## 2021-03-22 NOTE — PROGRESS NOTES
Chief Complaint   Patient presents with   • Annual Exam     Physical only.      Subjective   Sandra Guerrero is a 65 y.o. female.     Here today for annual physical exam.  PMH of HTN, hypothyroid, GERD, DJD and asthma/bronchitis.  HTN-BP and heart rate are well controlled today on amlodipine 5 mg and losartan 50 mg daily.  She has no CP, palpitations, edema or palpitations.    Hypothyroid-currently on levothyroxine 137 mcg daily. No changes in voice, swallow, depression, constipation.   No recent allergies or asthma sxs.   GERD- is not bothersome, if needed uses TUMS, tries to avoid spicy  HCM-mammogram November 2020.  Cologuard negative last year.  She declines to have Pap done.  She has had pneumococcal 23 vaccine and hepatitis A vaccines.  She has had her first COVID vaccine last week.   She is eating better overall recently.  Tries to walk her dog at the park once a week and is starting to work in the yard.  Will be retiring in about 3 months.    She sees the dentist twice a yr and eye doctor once a yr.    She does wear her seatbelt.        The following portions of the patient's history were reviewed and updated as appropriate: allergies, current medications, past family history, past medical history, past social history, past surgical history and problem list.    Review of Systems   Constitutional: Negative for fatigue.   HENT: Negative for trouble swallowing and voice change.    Eyes: Negative.    Respiratory: Negative for shortness of breath.    Cardiovascular: Negative for chest pain, palpitations and leg swelling.   Gastrointestinal: Negative for constipation.   Endocrine: Negative for cold intolerance and heat intolerance.   Genitourinary: Negative for difficulty urinating and vaginal pain.   Musculoskeletal: Negative.    Skin: Negative.    Allergic/Immunologic: Negative.    Neurological: Negative.    Hematological: Negative.    Psychiatric/Behavioral: Negative for confusion and dysphoric mood.  "      Objective   /82   Pulse 80   Temp 97 °F (36.1 °C)   Ht 165.1 cm (65\")   Wt 105 kg (231 lb)   SpO2 97%   BMI 38.44 kg/m²   Body mass index is 38.44 kg/m².  Physical Exam  Vitals and nursing note reviewed.   Constitutional:       General: She is not in acute distress.     Appearance: Normal appearance. She is well-developed. She is obese. She is not ill-appearing.      Comments: Pleasant female, appears her age and in no distress today   HENT:      Head: Normocephalic and atraumatic.      Right Ear: Tympanic membrane, ear canal and external ear normal.      Left Ear: Tympanic membrane, ear canal and external ear normal.   Eyes:      General:         Right eye: No discharge.         Left eye: No discharge.      Extraocular Movements: Extraocular movements intact.      Conjunctiva/sclera: Conjunctivae normal.      Pupils: Pupils are equal, round, and reactive to light.   Neck:      Thyroid: No thyromegaly.      Comments: No thyromegaly or mass  Cardiovascular:      Rate and Rhythm: Normal rate and regular rhythm.      Pulses: Normal pulses.      Heart sounds: Normal heart sounds. No murmur heard.        Comments: No carotid bruits  Pulmonary:      Effort: Pulmonary effort is normal. No respiratory distress.      Breath sounds: Normal breath sounds. No wheezing.   Abdominal:      General: Bowel sounds are normal. There is no distension.      Palpations: Abdomen is soft.      Tenderness: There is no abdominal tenderness.   Musculoskeletal:         General: Normal range of motion.      Cervical back: Normal range of motion and neck supple.      Right lower leg: No edema.      Left lower leg: No edema.   Lymphadenopathy:      Cervical: No cervical adenopathy.   Neurological:      General: No focal deficit present.      Mental Status: She is alert and oriented to person, place, and time. Mental status is at baseline.      Cranial Nerves: No cranial nerve deficit.      Coordination: Coordination normal. "   Psychiatric:         Mood and Affect: Mood normal.         Behavior: Behavior normal.         Thought Content: Thought content normal.         Judgment: Judgment normal.         Assessment/Plan   Sandra Guerrero is here today and the following problems have been addressed:      Diagnoses and all orders for this visit:    1. Physical exam (Primary)  -     CBC & Differential  -     Comprehensive Metabolic Panel  -     Lipid Panel With / Chol / HDL Ratio  -     TSH  -     T4, Free  -     Hepatitis C Antibody    2. Essential hypertension  -     amLODIPine (NORVASC) 5 MG tablet; Take 0.5 tablets by mouth Daily. 200001  Dispense: 30 tablet; Refill: 5  -     CBC & Differential  -     Comprehensive Metabolic Panel  -     Lipid Panel With / Chol / HDL Ratio    3. Hypothyroidism (acquired)  -     levothyroxine (SYNTHROID, LEVOTHROID) 137 MCG tablet; Take 1 tablet by mouth Daily. 200001  Dispense: 30 tablet; Refill: 6  -     TSH  -     T4, Free    4. Gastroesophageal reflux disease, unspecified whether esophagitis present  -     CBC & Differential    5. Encounter for hepatitis C screening test for low risk patient  -     Hepatitis C Antibody    6. Class 2 obesity due to excess calories without serious comorbidity with body mass index (BMI) of 38.0 to 38.9 in adult      Labs as noted  Recommend annual eye doctor appointment, or as necessary  See your dentist twice a year.  Recommend that you brush teeth twice daily and floss minimum of once daily  Recommend regular seatbelt use  Do not text or use phone while driving  Follow heart healthy/low salt diet  Avoid processed foods  Monitor blood pressure as discussed  Exercise as tolerated up to 150 minutes per week  Take all medications as prescribed  Continue current dose of levothyroxine, will adjust dose if needed based on labs  No significant GERD symptoms and that she eats excessive amounts of spicy food late at night  Patient declined breast exam today, mammogram is currently  up-to-date  She declines to have Pap done  Consider shingles vaccine 8 weeks after second Covid vaccine      Return in about 6 months (around 9/22/2021) for Next scheduled follow up.      Marilyn K. Vermeesch, MD      Please note that portions of this note were completed with a voice recognition program.  Efforts were made to edit dictation, but occasionally words are mistranscribed.

## 2021-03-23 LAB
ALBUMIN SERPL-MCNC: 3.9 G/DL (ref 3.5–5.2)
ALBUMIN/GLOB SERPL: 1.6 G/DL
ALP SERPL-CCNC: 70 U/L (ref 39–117)
ALT SERPL-CCNC: 18 U/L (ref 1–33)
AST SERPL-CCNC: 15 U/L (ref 1–32)
BASOPHILS # BLD AUTO: 0.05 10*3/MM3 (ref 0–0.2)
BASOPHILS NFR BLD AUTO: 1 % (ref 0–1.5)
BILIRUB SERPL-MCNC: 0.2 MG/DL (ref 0–1.2)
BUN SERPL-MCNC: 19 MG/DL (ref 8–23)
BUN/CREAT SERPL: 19.6 (ref 7–25)
CALCIUM SERPL-MCNC: 9.1 MG/DL (ref 8.6–10.5)
CHLORIDE SERPL-SCNC: 107 MMOL/L (ref 98–107)
CHOLEST SERPL-MCNC: 201 MG/DL (ref 0–200)
CHOLEST/HDLC SERPL: 3.59 {RATIO}
CO2 SERPL-SCNC: 25.8 MMOL/L (ref 22–29)
CREAT SERPL-MCNC: 0.97 MG/DL (ref 0.57–1)
EOSINOPHIL # BLD AUTO: 0.32 10*3/MM3 (ref 0–0.4)
EOSINOPHIL NFR BLD AUTO: 6.5 % (ref 0.3–6.2)
ERYTHROCYTE [DISTWIDTH] IN BLOOD BY AUTOMATED COUNT: 13.2 % (ref 12.3–15.4)
GLOBULIN SER CALC-MCNC: 2.4 GM/DL
GLUCOSE SERPL-MCNC: 99 MG/DL (ref 65–99)
HCT VFR BLD AUTO: 38.9 % (ref 34–46.6)
HCV AB S/CO SERPL IA: <0.1 S/CO RATIO (ref 0–0.9)
HDLC SERPL-MCNC: 56 MG/DL (ref 40–60)
HGB BLD-MCNC: 13 G/DL (ref 12–15.9)
IMM GRANULOCYTES # BLD AUTO: 0.01 10*3/MM3 (ref 0–0.05)
IMM GRANULOCYTES NFR BLD AUTO: 0.2 % (ref 0–0.5)
LDLC SERPL CALC-MCNC: 128 MG/DL (ref 0–100)
LYMPHOCYTES # BLD AUTO: 0.97 10*3/MM3 (ref 0.7–3.1)
LYMPHOCYTES NFR BLD AUTO: 19.7 % (ref 19.6–45.3)
MCH RBC QN AUTO: 31.7 PG (ref 26.6–33)
MCHC RBC AUTO-ENTMCNC: 33.4 G/DL (ref 31.5–35.7)
MCV RBC AUTO: 94.9 FL (ref 79–97)
MONOCYTES # BLD AUTO: 0.51 10*3/MM3 (ref 0.1–0.9)
MONOCYTES NFR BLD AUTO: 10.3 % (ref 5–12)
NEUTROPHILS # BLD AUTO: 3.07 10*3/MM3 (ref 1.7–7)
NEUTROPHILS NFR BLD AUTO: 62.3 % (ref 42.7–76)
NRBC BLD AUTO-RTO: 0 /100 WBC (ref 0–0.2)
PLATELET # BLD AUTO: 319 10*3/MM3 (ref 140–450)
POTASSIUM SERPL-SCNC: 4.7 MMOL/L (ref 3.5–5.2)
PROT SERPL-MCNC: 6.3 G/DL (ref 6–8.5)
RBC # BLD AUTO: 4.1 10*6/MM3 (ref 3.77–5.28)
SODIUM SERPL-SCNC: 141 MMOL/L (ref 136–145)
T4 FREE SERPL-MCNC: 1.86 NG/DL (ref 0.93–1.7)
TRIGL SERPL-MCNC: 96 MG/DL (ref 0–150)
TSH SERPL DL<=0.005 MIU/L-ACNC: 0.81 UIU/ML (ref 0.27–4.2)
VLDLC SERPL CALC-MCNC: 17 MG/DL (ref 5–40)
WBC # BLD AUTO: 4.93 10*3/MM3 (ref 3.4–10.8)

## 2021-05-01 DIAGNOSIS — I10 ESSENTIAL HYPERTENSION: ICD-10-CM

## 2021-05-03 RX ORDER — LOSARTAN POTASSIUM 50 MG/1
TABLET ORAL
Qty: 45 TABLET | Refills: 1 | Status: SHIPPED | OUTPATIENT
Start: 2021-05-03 | End: 2021-07-01

## 2021-07-01 DIAGNOSIS — I10 ESSENTIAL HYPERTENSION: ICD-10-CM

## 2021-07-01 RX ORDER — LOSARTAN POTASSIUM 50 MG/1
TABLET ORAL
Qty: 45 TABLET | Refills: 0 | Status: SHIPPED | OUTPATIENT
Start: 2021-07-01 | End: 2021-07-30

## 2021-07-30 ENCOUNTER — TELEPHONE (OUTPATIENT)
Dept: INTERNAL MEDICINE | Facility: CLINIC | Age: 66
End: 2021-07-30

## 2021-07-30 DIAGNOSIS — I10 ESSENTIAL HYPERTENSION: ICD-10-CM

## 2021-07-30 RX ORDER — LOSARTAN POTASSIUM 50 MG/1
TABLET ORAL
Qty: 135 TABLET | Refills: 1 | Status: SHIPPED | OUTPATIENT
Start: 2021-07-30 | End: 2021-12-07

## 2021-07-30 NOTE — TELEPHONE ENCOUNTER
Caller: Sandra Guerrero    Relationship: Self    Best call back number: [phone]    Medication needed:   Requested Prescriptions     Signed Prescriptions Disp Refills   • losartan (COZAAR) 50 MG tablet 135 tablet 1     Sig: TAKE 1 AND 1/2 TABLETS BY MOUTH ONE TIME A DAY     Authorizing Provider: MELQUIADES CHANEL     Ordering User: CARLTON KIRKLAND       When do you need the refill by: ASAP    What additional details did the patient provide when requesting the medication: PATIENT IS OUT AND STATES SHE WANTS HER PRESCRIPTION TO HAVE REFILLS AND NEEDS THIS TODAY    Does the patient have less than a 3 day supply:  [x] Yes  [] No    What is the patient's preferred pharmacy: [id number] - [place], [place] - [address] - [phone]  - [phone] FX

## 2021-07-30 NOTE — TELEPHONE ENCOUNTER
Attempted to call patient to advise the refill has already been sent to the pharmacy, no answer and voicemail said do not leave a message.

## 2021-08-30 DIAGNOSIS — E03.9 HYPOTHYROIDISM (ACQUIRED): ICD-10-CM

## 2021-08-30 RX ORDER — LEVOTHYROXINE SODIUM 137 UG/1
TABLET ORAL
Qty: 90 TABLET | Refills: 1 | Status: SHIPPED | OUTPATIENT
Start: 2021-08-30 | End: 2022-03-14

## 2021-12-07 DIAGNOSIS — I10 ESSENTIAL HYPERTENSION: ICD-10-CM

## 2021-12-07 RX ORDER — LOSARTAN POTASSIUM 50 MG/1
TABLET ORAL
Qty: 45 TABLET | Refills: 2 | Status: SHIPPED | OUTPATIENT
Start: 2021-12-07 | End: 2022-03-10

## 2022-03-10 DIAGNOSIS — I10 ESSENTIAL HYPERTENSION: ICD-10-CM

## 2022-03-10 RX ORDER — LOSARTAN POTASSIUM 50 MG/1
75 TABLET ORAL DAILY
Qty: 45 TABLET | Refills: 0 | Status: SHIPPED | OUTPATIENT
Start: 2022-03-10 | End: 2022-04-04 | Stop reason: SDUPTHER

## 2022-03-12 DIAGNOSIS — E03.9 HYPOTHYROIDISM (ACQUIRED): ICD-10-CM

## 2022-03-14 RX ORDER — LEVOTHYROXINE SODIUM 137 UG/1
137 TABLET ORAL DAILY
Qty: 30 TABLET | Refills: 0 | Status: SHIPPED | OUTPATIENT
Start: 2022-03-14 | End: 2022-04-04 | Stop reason: SDUPTHER

## 2022-04-04 ENCOUNTER — OFFICE VISIT (OUTPATIENT)
Dept: INTERNAL MEDICINE | Facility: CLINIC | Age: 67
End: 2022-04-04

## 2022-04-04 VITALS
DIASTOLIC BLOOD PRESSURE: 98 MMHG | HEART RATE: 80 BPM | RESPIRATION RATE: 16 BRPM | HEIGHT: 66 IN | TEMPERATURE: 97.3 F | SYSTOLIC BLOOD PRESSURE: 132 MMHG | BODY MASS INDEX: 38.57 KG/M2 | WEIGHT: 240 LBS | OXYGEN SATURATION: 96 %

## 2022-04-04 DIAGNOSIS — M19.90 ARTHRITIS: ICD-10-CM

## 2022-04-04 DIAGNOSIS — E03.9 HYPOTHYROIDISM (ACQUIRED): ICD-10-CM

## 2022-04-04 DIAGNOSIS — I10 ESSENTIAL HYPERTENSION: ICD-10-CM

## 2022-04-04 DIAGNOSIS — K21.9 GASTROESOPHAGEAL REFLUX DISEASE, UNSPECIFIED WHETHER ESOPHAGITIS PRESENT: Primary | ICD-10-CM

## 2022-04-04 PROBLEM — Z11.59 ENCOUNTER FOR HEPATITIS C SCREENING TEST FOR LOW RISK PATIENT: Status: RESOLVED | Noted: 2021-03-22 | Resolved: 2022-04-04

## 2022-04-04 PROCEDURE — 99214 OFFICE O/P EST MOD 30 MIN: CPT | Performed by: INTERNAL MEDICINE

## 2022-04-04 RX ORDER — LEVOTHYROXINE SODIUM 137 UG/1
137 TABLET ORAL DAILY
Qty: 30 TABLET | Refills: 5 | Status: SHIPPED | OUTPATIENT
Start: 2022-04-04 | End: 2022-10-04 | Stop reason: SDUPTHER

## 2022-04-04 RX ORDER — LOSARTAN POTASSIUM 50 MG/1
75 TABLET ORAL DAILY
Qty: 45 TABLET | Refills: 5 | Status: SHIPPED | OUTPATIENT
Start: 2022-04-04 | End: 2022-10-04 | Stop reason: SDUPTHER

## 2022-04-04 RX ORDER — AMLODIPINE BESYLATE 5 MG/1
2.5 TABLET ORAL DAILY
Qty: 30 TABLET | Refills: 5 | Status: SHIPPED | OUTPATIENT
Start: 2022-04-04 | End: 2022-04-11

## 2022-04-04 NOTE — PROGRESS NOTES
Chief Complaint   Patient presents with   • Follow-up     HTN, Hypothyroidism, Obesity     Subjective   Sandra Guerrero is a 66 y.o. female.     Here today for annual physical exam.  PMH of HTN, hypothyroid, GERD, DJD and asthma/bronchitis.  HTN-BP is slightly elevated today on amlodipine 5 mg and losartan 50 mg daily.  She has no CP, palpitations, edema or palpitations.    Hypothyroid-currently on levothyroxine 137 mcg daily on empty stomach. No changes in voice, swallow, depression, constipation.   Allergies- she is taking zyxal right now and using nasonex also.    GERD- is not bothersome, if needed uses TUMS, tries to avoid spicy foods and rarely needs TUMS  HCM-mammogram November 2020.  Cologuard negative last year.  She declines to have Pap done.  She has had pneumococcal 23 vaccine and hepatitis A vaccines.  She is having a lot of neck pain.  Her neck pops at times.  The left side of neck is tight.  She is taking aleve.  She has tried biofreeze and muscle rubs.         The following portions of the patient's history were reviewed and updated as appropriate: allergies, current medications, past family history, past medical history, past social history, past surgical history and problem list.    Review of Systems   Constitutional: Negative for activity change, appetite change and unexpected weight change.   HENT: Negative for trouble swallowing and voice change.    Eyes: Negative for visual disturbance.   Respiratory: Negative for shortness of breath.    Cardiovascular: Negative for chest pain, palpitations and leg swelling.   Gastrointestinal: Negative for abdominal pain and constipation.   Endocrine: Negative for cold intolerance and heat intolerance.   Genitourinary: Negative for hematuria.   Musculoskeletal: Positive for neck pain. Negative for back pain.   Neurological: Negative for headaches.   Psychiatric/Behavioral: Negative for dysphoric mood and sleep disturbance. The patient is not nervous/anxious.   "      Objective   /98   Pulse 80   Temp 97.3 °F (36.3 °C)   Resp 16   Ht 166.4 cm (65.5\")   Wt 109 kg (240 lb)   SpO2 96%   BMI 39.33 kg/m²   Body mass index is 39.33 kg/m².  Physical Exam  Vitals and nursing note reviewed.   Constitutional:       General: She is not in acute distress.     Appearance: Normal appearance. She is well-developed. She is obese. She is not ill-appearing.      Comments: Kind and pleasant female, appears stated age and in NAD today   HENT:      Head: Normocephalic and atraumatic.      Right Ear: External ear normal.      Left Ear: External ear normal.   Eyes:      General:         Right eye: No discharge.         Left eye: No discharge.      Extraocular Movements: Extraocular movements intact.      Conjunctiva/sclera: Conjunctivae normal.      Pupils: Pupils are equal, round, and reactive to light.   Neck:      Thyroid: No thyromegaly.      Vascular: No carotid bruit.      Comments: No thyromegaly or mass  Cardiovascular:      Rate and Rhythm: Normal rate and regular rhythm.      Pulses: Normal pulses.      Heart sounds: Normal heart sounds. No murmur heard.  Pulmonary:      Effort: Pulmonary effort is normal. No respiratory distress.      Breath sounds: Normal breath sounds. No wheezing.   Abdominal:      General: Bowel sounds are normal. There is no distension.      Palpations: Abdomen is soft.      Tenderness: There is no abdominal tenderness.   Musculoskeletal:         General: Normal range of motion.      Cervical back: Normal range of motion and neck supple.      Right lower leg: No edema.      Left lower leg: No edema.   Lymphadenopathy:      Cervical: No cervical adenopathy.   Skin:     General: Skin is warm.      Findings: No rash.   Neurological:      General: No focal deficit present.      Mental Status: She is alert and oriented to person, place, and time. Mental status is at baseline.      Cranial Nerves: No cranial nerve deficit.      Motor: No weakness.      " Coordination: Coordination normal.      Gait: Gait normal.   Psychiatric:         Mood and Affect: Mood normal.         Behavior: Behavior normal.         Thought Content: Thought content normal.         Judgment: Judgment normal.         Assessment/Plan   Sandra Guerrero is here today and the following problems have been addressed:      Diagnoses and all orders for this visit:    1. Gastroesophageal reflux disease, unspecified whether esophagitis present (Primary)    2. Essential hypertension  -     amLODIPine (NORVASC) 5 MG tablet; Take 0.5 tablets by mouth Daily. 200001  Dispense: 30 tablet; Refill: 5  -     losartan (COZAAR) 50 MG tablet; Take 1.5 tablets by mouth Daily.  Dispense: 45 tablet; Refill: 5    3. Hypothyroidism (acquired)  -     levothyroxine (SYNTHROID, LEVOTHROID) 137 MCG tablet; Take 1 tablet by mouth Daily.  Dispense: 30 tablet; Refill: 5    4. Arthritis      Labs will be due with next visit in 6 months  Follow heart healthy/low salt diet  Avoid processed foods  Monitor blood pressure as discussed  Exercise as tolerated up to 150 minutes per week  Take all medications as prescribed  Recommend heat to her neck followed by stretches  Also recommend Tylenol extra strength 2 tablets a.m. and p.m.  Patient states she has had all 3 COVID vaccines however there is no documentation in chart, request that she bring her card with her to next office visit    Return in about 6 months (around 10/4/2022) for Medicare Wellness.      Marilyn K. Vermeesch, MD      Please note that portions of this note were completed with a voice recognition program.  Efforts were made to edit dictation, but occasionally words are mistranscribed.

## 2022-04-06 DIAGNOSIS — E03.9 HYPOTHYROIDISM (ACQUIRED): ICD-10-CM

## 2022-04-06 DIAGNOSIS — I10 ESSENTIAL HYPERTENSION: ICD-10-CM

## 2022-04-06 RX ORDER — LEVOTHYROXINE SODIUM 137 UG/1
TABLET ORAL
Qty: 30 TABLET | Refills: 5 | OUTPATIENT
Start: 2022-04-06

## 2022-04-06 RX ORDER — LOSARTAN POTASSIUM 50 MG/1
TABLET ORAL
Qty: 45 TABLET | Refills: 5 | OUTPATIENT
Start: 2022-04-06

## 2022-04-10 DIAGNOSIS — I10 ESSENTIAL HYPERTENSION: ICD-10-CM

## 2022-04-11 RX ORDER — AMLODIPINE BESYLATE 5 MG/1
TABLET ORAL
Qty: 45 TABLET | Refills: 1 | Status: SHIPPED | OUTPATIENT
Start: 2022-04-11 | End: 2022-10-04 | Stop reason: SDUPTHER

## 2022-10-04 ENCOUNTER — OFFICE VISIT (OUTPATIENT)
Dept: INTERNAL MEDICINE | Facility: CLINIC | Age: 67
End: 2022-10-04

## 2022-10-04 VITALS
SYSTOLIC BLOOD PRESSURE: 140 MMHG | WEIGHT: 234 LBS | OXYGEN SATURATION: 97 % | TEMPERATURE: 97.7 F | HEART RATE: 72 BPM | DIASTOLIC BLOOD PRESSURE: 82 MMHG | HEIGHT: 66 IN | BODY MASS INDEX: 37.61 KG/M2

## 2022-10-04 DIAGNOSIS — Z12.31 SCREENING MAMMOGRAM, ENCOUNTER FOR: ICD-10-CM

## 2022-10-04 DIAGNOSIS — E03.9 HYPOTHYROIDISM (ACQUIRED): ICD-10-CM

## 2022-10-04 DIAGNOSIS — L98.9 SKIN LESION OF NECK: ICD-10-CM

## 2022-10-04 DIAGNOSIS — I10 ESSENTIAL HYPERTENSION: ICD-10-CM

## 2022-10-04 PROCEDURE — 99214 OFFICE O/P EST MOD 30 MIN: CPT | Performed by: INTERNAL MEDICINE

## 2022-10-04 PROCEDURE — G0008 ADMIN INFLUENZA VIRUS VAC: HCPCS | Performed by: INTERNAL MEDICINE

## 2022-10-04 PROCEDURE — 90662 IIV NO PRSV INCREASED AG IM: CPT | Performed by: INTERNAL MEDICINE

## 2022-10-04 RX ORDER — LEVOTHYROXINE SODIUM 137 UG/1
137 TABLET ORAL DAILY
Qty: 90 TABLET | Refills: 1 | Status: SHIPPED | OUTPATIENT
Start: 2022-10-04 | End: 2022-10-06

## 2022-10-04 RX ORDER — AMLODIPINE BESYLATE 5 MG/1
2.5 TABLET ORAL DAILY
Qty: 45 TABLET | Refills: 1 | Status: SHIPPED | OUTPATIENT
Start: 2022-10-04

## 2022-10-04 RX ORDER — LOSARTAN POTASSIUM 50 MG/1
75 TABLET ORAL DAILY
Qty: 45 TABLET | Refills: 1 | Status: SHIPPED | OUTPATIENT
Start: 2022-10-04 | End: 2022-12-27

## 2022-10-04 NOTE — PROGRESS NOTES
Chief Complaint   Patient presents with   • Follow-up     For HTN and hypothyroidism.     Subjective   Sandra Guerrero is a 67 y.o. female.     History of Present Illness  Here today for follow up of HTN, hypothyroid, GERD, DJD and asthma/bronchitis.  HTN-BP is slightly elevated today on amlodipine 5 mg and losartan 50 mg daily.  Home reads run 135-140/80s. She has no CP, palpitations, edema or palpitations.    Hypothyroid-currently on levothyroxine 137 mcg daily on empty stomach. No changes in voice, swallow, depression, constipation.   Allergies- have been bothersome recently, she is taking nasacort.  No asthma flare.   GERD- no current sxs  HCM-mammogram November 2020.  Cologuard negative 2020.  She declines to have Pap done.  She has had pneumococcal 23 vaccine and hepatitis A vaccines.  Given flu vaccine today.  She will get her COVID booster soon.   She has a skin lesion over right side of neck that has been present since early this year.  It is intermittently itchy.  No bleeding.       The following portions of the patient's history were reviewed and updated as appropriate: allergies, current medications, past family history, past medical history, past social history, past surgical history and problem list.    Review of Systems   Constitutional: Negative for activity change, appetite change and unexpected weight change.   HENT: Negative for trouble swallowing and voice change.    Eyes: Negative for visual disturbance.   Respiratory: Negative for shortness of breath.    Cardiovascular: Negative for chest pain, palpitations and leg swelling.   Gastrointestinal: Negative for abdominal pain and constipation.   Genitourinary: Negative for hematuria.   Musculoskeletal: Negative for back pain.   Skin:        Skin lesion right side of neck   Allergic/Immunologic: Positive for environmental allergies.   Neurological: Negative for headaches.   Psychiatric/Behavioral: Negative for dysphoric mood and sleep disturbance.  "The patient is not nervous/anxious.        Objective   /82   Pulse 72   Temp 97.7 °F (36.5 °C)   Ht 166.4 cm (65.5\")   Wt 106 kg (234 lb)   SpO2 97%   BMI 38.35 kg/m²   Body mass index is 38.35 kg/m².  Physical Exam  Vitals and nursing note reviewed.   Constitutional:       General: She is not in acute distress.     Appearance: Normal appearance. She is well-developed. She is obese. She is not ill-appearing.      Comments: Kind and pleasant , appears stated age and in NAD today   HENT:      Head: Normocephalic and atraumatic.      Right Ear: External ear normal.      Left Ear: External ear normal.   Eyes:      General:         Right eye: No discharge.         Left eye: No discharge.      Extraocular Movements: Extraocular movements intact.      Conjunctiva/sclera: Conjunctivae normal.   Neck:      Thyroid: No thyromegaly.      Vascular: No carotid bruit.      Comments: No thyromegaly or mass  Cardiovascular:      Rate and Rhythm: Normal rate and regular rhythm.      Pulses: Normal pulses.      Heart sounds: Normal heart sounds. No murmur heard.  Pulmonary:      Effort: Pulmonary effort is normal. No respiratory distress.      Breath sounds: Normal breath sounds. No wheezing.   Abdominal:      General: Bowel sounds are normal. There is no distension.      Palpations: Abdomen is soft.      Tenderness: There is no abdominal tenderness.   Musculoskeletal:         General: Normal range of motion.      Cervical back: Normal range of motion and neck supple.      Right lower leg: No edema.      Left lower leg: No edema.   Lymphadenopathy:      Cervical: No cervical adenopathy.   Skin:     General: Skin is warm.      Findings: No rash.      Comments: Right side of neck with approximate 2 cm x 8 mm elongated macular lesion with scabbing at both ends of lesion, feels cystic in nature subcutaneously   Neurological:      General: No focal deficit present.      Mental Status: She is alert and oriented to person, " place, and time. Mental status is at baseline.      Cranial Nerves: No cranial nerve deficit.      Motor: No weakness.      Coordination: Coordination normal.      Gait: Gait normal.   Psychiatric:         Mood and Affect: Mood normal.         Behavior: Behavior normal.         Thought Content: Thought content normal.         Judgment: Judgment normal.         Assessment & Plan   Sandra Guerrero is here today and the following problems have been addressed:      Diagnoses and all orders for this visit:    1. Essential hypertension  -     amLODIPine (NORVASC) 5 MG tablet; Take 0.5 tablets by mouth Daily.  Dispense: 45 tablet; Refill: 1  -     losartan (COZAAR) 50 MG tablet; Take 1.5 tablets by mouth Daily.  Dispense: 45 tablet; Refill: 1  -     CBC & Differential  -     Comprehensive Metabolic Panel  -     Lipid Panel With / Chol / HDL Ratio    2. Hypothyroidism (acquired)  -     levothyroxine (SYNTHROID, LEVOTHROID) 137 MCG tablet; Take 1 tablet by mouth Daily.  Dispense: 90 tablet; Refill: 1  -     T4, Free  -     TSH    3. Screening mammogram, encounter for  -     Mammo Screening Digital Tomosynthesis Bilateral With CAD; Future    4. Skin lesion of neck  -     Ambulatory Referral to Dermatology    Other orders  -     Fluzone High-Dose 65+yrs (2797-4197)        Follow heart healthy/low salt diet  Avoid processed foods  Monitor blood pressure on occasion  Exercise as tolerated up to 150 minutes per week  Take all medications as prescribed  Continue current dose of levothyroxine, will adjust dose if needed based on labs  Mammogram ordered, will schedule for patient  Refer to dermatology due to skin lesion on right side of neck, this may be skin cancer versus cystic lesion  Flu vaccine provided today    Return in about 6 months (around 4/4/2023) for Next scheduled follow up.      Marilyn K. Vermeesch, MD      Please note that portions of this note were completed with a voice recognition program.  Efforts were made to  edit dictation, but occasionally words are mistranscribed.

## 2022-10-05 LAB
ALBUMIN SERPL-MCNC: 4.6 G/DL (ref 3.5–5.2)
ALBUMIN/GLOB SERPL: 2.2 G/DL
ALP SERPL-CCNC: 74 U/L (ref 39–117)
ALT SERPL-CCNC: 28 U/L (ref 1–33)
AST SERPL-CCNC: 20 U/L (ref 1–32)
BASOPHILS # BLD AUTO: 0.06 10*3/MM3 (ref 0–0.2)
BASOPHILS NFR BLD AUTO: 1 % (ref 0–1.5)
BILIRUB SERPL-MCNC: 0.3 MG/DL (ref 0–1.2)
BUN SERPL-MCNC: 16 MG/DL (ref 8–23)
BUN/CREAT SERPL: 16.5 (ref 7–25)
CALCIUM SERPL-MCNC: 9.8 MG/DL (ref 8.6–10.5)
CHLORIDE SERPL-SCNC: 102 MMOL/L (ref 98–107)
CHOLEST SERPL-MCNC: 234 MG/DL (ref 0–200)
CHOLEST/HDLC SERPL: 3.6 {RATIO}
CO2 SERPL-SCNC: 28.4 MMOL/L (ref 22–29)
CREAT SERPL-MCNC: 0.97 MG/DL (ref 0.57–1)
EGFRCR SERPLBLD CKD-EPI 2021: 65 ML/MIN/1.73
EOSINOPHIL # BLD AUTO: 0.31 10*3/MM3 (ref 0–0.4)
EOSINOPHIL NFR BLD AUTO: 5.2 % (ref 0.3–6.2)
ERYTHROCYTE [DISTWIDTH] IN BLOOD BY AUTOMATED COUNT: 12.7 % (ref 12.3–15.4)
GLOBULIN SER CALC-MCNC: 2.1 GM/DL
GLUCOSE SERPL-MCNC: 96 MG/DL (ref 65–99)
HCT VFR BLD AUTO: 39.3 % (ref 34–46.6)
HDLC SERPL-MCNC: 65 MG/DL (ref 40–60)
HGB BLD-MCNC: 13.4 G/DL (ref 12–15.9)
IMM GRANULOCYTES # BLD AUTO: 0.02 10*3/MM3 (ref 0–0.05)
IMM GRANULOCYTES NFR BLD AUTO: 0.3 % (ref 0–0.5)
LDLC SERPL CALC-MCNC: 146 MG/DL (ref 0–100)
LYMPHOCYTES # BLD AUTO: 1.35 10*3/MM3 (ref 0.7–3.1)
LYMPHOCYTES NFR BLD AUTO: 22.7 % (ref 19.6–45.3)
MCH RBC QN AUTO: 32.3 PG (ref 26.6–33)
MCHC RBC AUTO-ENTMCNC: 34.1 G/DL (ref 31.5–35.7)
MCV RBC AUTO: 94.7 FL (ref 79–97)
MONOCYTES # BLD AUTO: 0.61 10*3/MM3 (ref 0.1–0.9)
MONOCYTES NFR BLD AUTO: 10.2 % (ref 5–12)
NEUTROPHILS # BLD AUTO: 3.61 10*3/MM3 (ref 1.7–7)
NEUTROPHILS NFR BLD AUTO: 60.6 % (ref 42.7–76)
NRBC BLD AUTO-RTO: 0 /100 WBC (ref 0–0.2)
PLATELET # BLD AUTO: 349 10*3/MM3 (ref 140–450)
POTASSIUM SERPL-SCNC: 5 MMOL/L (ref 3.5–5.2)
PROT SERPL-MCNC: 6.7 G/DL (ref 6–8.5)
RBC # BLD AUTO: 4.15 10*6/MM3 (ref 3.77–5.28)
SODIUM SERPL-SCNC: 140 MMOL/L (ref 136–145)
T4 FREE SERPL-MCNC: 1.72 NG/DL (ref 0.93–1.7)
TRIGL SERPL-MCNC: 132 MG/DL (ref 0–150)
TSH SERPL DL<=0.005 MIU/L-ACNC: 0.89 UIU/ML (ref 0.27–4.2)
VLDLC SERPL CALC-MCNC: 23 MG/DL (ref 5–40)
WBC # BLD AUTO: 5.96 10*3/MM3 (ref 3.4–10.8)

## 2022-10-06 DIAGNOSIS — E03.9 HYPOTHYROIDISM (ACQUIRED): ICD-10-CM

## 2022-10-06 RX ORDER — LEVOTHYROXINE SODIUM 0.12 MG/1
125 TABLET ORAL DAILY
Qty: 30 TABLET | Refills: 1 | Status: SHIPPED | OUTPATIENT
Start: 2022-10-06 | End: 2022-11-09

## 2022-10-06 NOTE — PROGRESS NOTES
Please call patient with lab results.  Complete blood count, blood sugar, kidney and liver function are normal.  Thyroid function test revealed elevated free T4 and low normal TSH.  I have decreased her levothyroxine to 125 mcg daily and sent in a 30-day supply with 1 refill.  Repeat thyroid function tests are ordered for 6 weeks.  Please tell her to make sure she is not taking a multivitamin or biotin supplement for 1 week prior to lab draw and return in 6 weeks.  If labs are normal a 1 year refill will be provided at that time.  Her cholesterol panel is elevated with high total and LDL or bad cholesterol but also a high good cholesterol.  It is still important to avoid excessive amounts of fast or fatty food and do her best to exercise for 30 minutes 5 days a week.  I recommend oatmeal a few times a week and Metamucil 1 scoop daily in 8 ounces of drink of choice.  Turmeric with black pepper 500 mg with breakfast once daily will also help reduce cholesterol levels.

## 2022-11-09 DIAGNOSIS — E03.9 HYPOTHYROIDISM (ACQUIRED): ICD-10-CM

## 2022-11-09 LAB
T4 FREE SERPL-MCNC: 1.78 NG/DL (ref 0.93–1.7)
TSH SERPL DL<=0.005 MIU/L-ACNC: 1.08 UIU/ML (ref 0.27–4.2)

## 2022-11-09 RX ORDER — LEVOTHYROXINE SODIUM 112 UG/1
112 TABLET ORAL DAILY
Qty: 30 TABLET | Refills: 1 | Status: SHIPPED | OUTPATIENT
Start: 2022-11-09 | End: 2022-12-21 | Stop reason: SDUPTHER

## 2022-11-29 ENCOUNTER — HOSPITAL ENCOUNTER (OUTPATIENT)
Dept: MAMMOGRAPHY | Facility: HOSPITAL | Age: 67
Discharge: HOME | End: 2022-11-29
Admitting: INTERNAL MEDICINE

## 2022-11-29 DIAGNOSIS — Z12.31 SCREENING MAMMOGRAM, ENCOUNTER FOR: ICD-10-CM

## 2022-11-29 PROCEDURE — 77063 BREAST TOMOSYNTHESIS BI: CPT

## 2022-11-29 PROCEDURE — 77067 SCR MAMMO BI INCL CAD: CPT | Performed by: RADIOLOGY

## 2022-11-29 PROCEDURE — 77067 SCR MAMMO BI INCL CAD: CPT

## 2022-11-29 PROCEDURE — 77063 BREAST TOMOSYNTHESIS BI: CPT | Performed by: RADIOLOGY

## 2022-12-21 DIAGNOSIS — E03.9 HYPOTHYROIDISM (ACQUIRED): ICD-10-CM

## 2022-12-21 LAB
T4 FREE SERPL-MCNC: 1.49 NG/DL (ref 0.93–1.7)
TSH SERPL DL<=0.005 MIU/L-ACNC: 6.09 UIU/ML (ref 0.27–4.2)

## 2022-12-21 RX ORDER — LEVOTHYROXINE SODIUM 112 UG/1
112 TABLET ORAL DAILY
Qty: 90 TABLET | Refills: 3 | Status: SHIPPED | OUTPATIENT
Start: 2022-12-21

## 2022-12-27 DIAGNOSIS — I10 ESSENTIAL HYPERTENSION: ICD-10-CM

## 2022-12-27 RX ORDER — LOSARTAN POTASSIUM 50 MG/1
TABLET ORAL
Qty: 135 TABLET | Refills: 1 | Status: SHIPPED | OUTPATIENT
Start: 2022-12-27

## 2022-12-27 NOTE — TELEPHONE ENCOUNTER
Rx Refill Note  Requested Prescriptions     Pending Prescriptions Disp Refills   • losartan (COZAAR) 50 MG tablet [Pharmacy Med Name: Losartan Potassium Oral Tablet 50 MG] 45 tablet 0     Sig: TAKE 1 AND 1/2 TABLETS BY MOUTH ONE TIME A DAY      Last office visit with prescribing clinician: 10/4/2022   Last telemedicine visit with prescribing clinician: Visit date not found   Next office visit with prescribing clinician: Visit date not found                         Would you like a call back once the refill request has been completed: [] Yes [] No    If the office needs to give you a call back, can they leave a voicemail: [] Yes [] No    Mariela Stacy LPN  12/27/22, 16:24 EST

## 2023-07-26 DIAGNOSIS — I10 ESSENTIAL HYPERTENSION: ICD-10-CM

## 2023-07-26 RX ORDER — LOSARTAN POTASSIUM 50 MG/1
75 TABLET ORAL DAILY
Qty: 135 TABLET | Refills: 1 | Status: SHIPPED | OUTPATIENT
Start: 2023-07-26

## 2023-08-08 ENCOUNTER — OFFICE VISIT (OUTPATIENT)
Dept: INTERNAL MEDICINE | Facility: CLINIC | Age: 68
End: 2023-08-08
Payer: MEDICARE

## 2023-08-08 VITALS
SYSTOLIC BLOOD PRESSURE: 148 MMHG | WEIGHT: 235.4 LBS | HEART RATE: 78 BPM | OXYGEN SATURATION: 96 % | BODY MASS INDEX: 37.83 KG/M2 | DIASTOLIC BLOOD PRESSURE: 90 MMHG | HEIGHT: 66 IN | TEMPERATURE: 97.1 F

## 2023-08-08 DIAGNOSIS — I10 ESSENTIAL HYPERTENSION: ICD-10-CM

## 2023-08-08 DIAGNOSIS — Z12.12 ENCOUNTER FOR COLORECTAL CANCER SCREENING: ICD-10-CM

## 2023-08-08 DIAGNOSIS — M25.50 GENERALIZED JOINT PAIN: ICD-10-CM

## 2023-08-08 DIAGNOSIS — R53.83 OTHER FATIGUE: ICD-10-CM

## 2023-08-08 DIAGNOSIS — Z00.00 WELCOME TO MEDICARE PREVENTIVE VISIT: Primary | ICD-10-CM

## 2023-08-08 DIAGNOSIS — Z12.11 ENCOUNTER FOR COLORECTAL CANCER SCREENING: ICD-10-CM

## 2023-08-08 DIAGNOSIS — E03.9 HYPOTHYROIDISM (ACQUIRED): ICD-10-CM

## 2023-08-08 DIAGNOSIS — Z79.899 OTHER LONG TERM (CURRENT) DRUG THERAPY: ICD-10-CM

## 2023-08-08 DIAGNOSIS — Z23 NEED FOR STREPTOCOCCUS PNEUMONIAE VACCINATION: ICD-10-CM

## 2023-08-08 DIAGNOSIS — Z78.0 ASYMPTOMATIC MENOPAUSE: ICD-10-CM

## 2023-08-08 PROCEDURE — 1170F FXNL STATUS ASSESSED: CPT | Performed by: STUDENT IN AN ORGANIZED HEALTH CARE EDUCATION/TRAINING PROGRAM

## 2023-08-08 PROCEDURE — 90677 PCV20 VACCINE IM: CPT | Performed by: STUDENT IN AN ORGANIZED HEALTH CARE EDUCATION/TRAINING PROGRAM

## 2023-08-08 PROCEDURE — 99214 OFFICE O/P EST MOD 30 MIN: CPT | Performed by: STUDENT IN AN ORGANIZED HEALTH CARE EDUCATION/TRAINING PROGRAM

## 2023-08-08 PROCEDURE — G0402 INITIAL PREVENTIVE EXAM: HCPCS | Performed by: STUDENT IN AN ORGANIZED HEALTH CARE EDUCATION/TRAINING PROGRAM

## 2023-08-08 PROCEDURE — G0009 ADMIN PNEUMOCOCCAL VACCINE: HCPCS | Performed by: STUDENT IN AN ORGANIZED HEALTH CARE EDUCATION/TRAINING PROGRAM

## 2023-08-08 PROCEDURE — 3080F DIAST BP >= 90 MM HG: CPT | Performed by: STUDENT IN AN ORGANIZED HEALTH CARE EDUCATION/TRAINING PROGRAM

## 2023-08-08 PROCEDURE — 3077F SYST BP >= 140 MM HG: CPT | Performed by: STUDENT IN AN ORGANIZED HEALTH CARE EDUCATION/TRAINING PROGRAM

## 2023-08-08 PROCEDURE — 93000 ELECTROCARDIOGRAM COMPLETE: CPT | Performed by: STUDENT IN AN ORGANIZED HEALTH CARE EDUCATION/TRAINING PROGRAM

## 2023-08-08 RX ORDER — LOSARTAN POTASSIUM 50 MG/1
75 TABLET ORAL DAILY
Qty: 135 TABLET | Refills: 3 | Status: SHIPPED | OUTPATIENT
Start: 2023-08-08

## 2023-08-08 RX ORDER — LEVOTHYROXINE SODIUM 112 UG/1
112 TABLET ORAL DAILY
Qty: 90 TABLET | Refills: 3 | Status: SHIPPED | OUTPATIENT
Start: 2023-08-08

## 2023-08-08 NOTE — PROGRESS NOTES
The ABCs of the Annual Wellness Visit  [place] to Medicare Visit    Subjective     Sandra Guerrero is a 67 y.o. female who presents for a  Welcome to Medicare Visit.    The following portions of the patient's history were reviewed and   updated as appropriate: allergies, current medications, past family history, past medical history, past social history, past surgical history, and problem list.     Compared to one year ago, the patient feels her physical   health is the same.    Compared to one year ago, the patient feels her mental   health is worse.    Recent Hospitalizations:  She was not admitted to the hospital during the last year.       Current Medical Providers:  Patient Care Team:  Magaly Denton, DO as PCP - General (Family Medicine)    Outpatient Medications Prior to Visit   Medication Sig Dispense Refill    levothyroxine (SYNTHROID, LEVOTHROID) 112 MCG tablet Take 1 tablet by mouth Daily. 90 tablet 3    losartan (COZAAR) 50 MG tablet Take 1.5 tablets by mouth Daily. 135 tablet 1    amLODIPine (NORVASC) 5 MG tablet Take 0.5 tablets by mouth Daily. 45 tablet 1     No facility-administered medications prior to visit.       No opioid medication identified on active medication list. I have reviewed chart for other potential  high risk medication/s and harmful drug interactions in the elderly.        Aspirin is not on active medication list.  Aspirin use is not indicated based on review of current medical condition/s. Risk of harm outweighs potential benefits.  .    Patient Active Problem List   Diagnosis    Acid reflux disease    Arthritis    BMI 39.0-39.9,adult    Chronic bronchitis    Hypothyroidism (acquired)    Asthma    Essential hypertension    Physical exam    Screening for colon cancer    Screening mammogram, encounter for    Skin lesion of neck     Advance Care Planning   Advance Care Planning     Advance Directive is not on file.  ACP discussion was held with the patient during this visit.  "Patient has an advance directive (not in EMR), copy requested.       Objective   Vitals:    23 1303 23 1356   BP: 160/98 148/90   Pulse: 78    Temp: 97.1 øF (36.2 øC)    TempSrc: Infrared    SpO2: 96%    Weight: 107 kg (235 lb 6.4 oz)    Height: 166.4 cm (65.51\")      Estimated body mass index is 38.56 kg/mý as calculated from the following:    Height as of this encounter: 166.4 cm (65.51\").    Weight as of this encounter: 107 kg (235 lb 6.4 oz).    Class 2 Severe Obesity (BMI >=35 and <=39.9). Obesity-related health conditions include the following: hypertension. Obesity is unchanged. BMI is is above average; BMI management plan is completed. We discussed portion control and increasing exercise.      Does the patient have evidence of cognitive impairment?   No           ECG 12 Lead    Date/Time: 2023 2:05 PM  Performed by: Magaly Denton DO  Authorized by: Magaly Denton DO   Comparison: not compared with previous ECG   Previous ECG: no previous ECG available  Rhythm: sinus rhythm  Rate: normal  Conduction: conduction normal  ST Segments: ST segments normal  T Waves: T waves normal  QRS axis: normal  Other: no other findings    Clinical impression: normal ECG           HEALTH RISK ASSESSMENT    Smoking Status:  Social History     Tobacco Use   Smoking Status Never   Smokeless Tobacco Never     Alcohol Consumption:  Social History     Substance and Sexual Activity   Alcohol Use Yes    Comment: occ       Fall Risk Screen:    STEADI Fall Risk Assessment was completed, and patient is at LOW risk for falls.Assessment completed on:2023    Depression Screen:       2023     1:00 PM   PHQ-2/PHQ-9 Depression Screening   Little Interest or Pleasure in Doing Things 0-->not at all   Feeling Down, Depressed or Hopeless 1-->several days   PHQ-9: Brief Depression Severity Measure Score 1       Health Habits and Functional and Cognitive Screenin/8/2023     1:00 PM   Functional & " Cognitive Status   Do you have difficulty preparing food and eating? No   Do you have difficulty bathing yourself, getting dressed or grooming yourself? No   Do you have difficulty using the toilet? No   Do you have difficulty moving around from place to place? No   Do you have trouble with steps or getting out of a bed or a chair? Yes   Current Diet Other   Dental Exam Up to date   Eye Exam Not up to date   Exercise (times per week) 7 times per week   Current Exercises Include Gardening;Walking;Weightlifting   Do you need help using the phone?  No   Are you deaf or do you have serious difficulty hearing?  No   Do you need help to go to places out of walking distance? No   Do you need help shopping? No   Do you need help preparing meals?  No   Do you need help with housework?  No   Do you need help with laundry? No   Do you need help taking your medications? No   Do you need help managing money? No   Do you ever drive or ride in a car without wearing a seat belt? No   Have you felt unusual stress, anger or loneliness in the last month? Yes   Who do you live with? Sibling   If you need help, do you have trouble finding someone available to you? No   Have you been bothered in the last four weeks by sexual problems? No   Do you have difficulty concentrating, remembering or making decisions? Yes       Visual Acuity:    Vision Screening    Right eye Left eye Both eyes   Without correction 20/30 20/30 20/30   With correction 20/30 20/30 20/30       Age-appropriate Screening Schedule:  Refer to the list below for future screening recommendations based on patient's age, sex and/or medical conditions. Orders for these recommended tests are listed in the plan section. The patient has been provided with a written plan.    Health Maintenance   Topic Date Due    DXA SCAN  Never done    ANNUAL WELLNESS VISIT  Never done    Pneumococcal Vaccine 65+ (2 - PCV) 02/20/2020    COLORECTAL CANCER SCREENING  10/20/2020    COVID-19 Vaccine  "(4 - Moderna series) 06/08/2022    ZOSTER VACCINE (2 of 2) 08/08/2023 (Originally 8/1/2021)    INFLUENZA VACCINE  10/01/2023    MAMMOGRAM  11/29/2024    TDAP/TD VACCINES (2 - Td or Tdap) 01/18/2029    HEPATITIS C SCREENING  Completed    PAP SMEAR  Discontinued        CMS Preventative Services Quick Reference  Risk Factors Identified During Encounter    Dental Screening Recommended  Vision Screening Recommended  The above risks/problems have been discussed with the patient.  Pertinent information has been shared with the patient in the After Visit Summary.    Follow Up:   Initial Medicare Visit in one year    An After Visit Summary and PPPS were made available to the patient.      Additional E&M Note during same encounter follows:  Patient has multiple medical problems which are significant and separately identifiable that require additional work above and beyond the Medicare Wellness Visit.      Chief Complaint  Medicare Wellness-Initial Visit    Subjective        Chronic fatigue-patient has noticed she has been extremely tired over the past couple years.  Not normal for her.  States that she could fall asleep at any time if she sits still for too long.    Generalized joint pain-patient was told many years ago she had osteoarthritis.  However rheumatoid arthritis runs in her dad's family.  She has joint pain diffusely and and nearly every joint in her body starting from her neck down.  Takes Tylenol and Motrin as needed for this but sometimes has a hard time sleeping due to the pain.  Notices swelling and deformation in her fingers    Sandra Guerrero is also being seen today for other complaints    Review of Systems   All other systems reviewed and are negative.    Objective   Vital Signs:  /90   Pulse 78   Temp 97.1 øF (36.2 øC) (Infrared)   Ht 166.4 cm (65.51\")   Wt 107 kg (235 lb 6.4 oz)   SpO2 96%   BMI 38.56 kg/mý     Physical Exam  Vitals and nursing note reviewed.   Constitutional:       " Appearance: Normal appearance.   HENT:      Head: Normocephalic and atraumatic.      Right Ear: External ear normal.      Left Ear: External ear normal.      Nose: Nose normal.      Mouth/Throat:      Mouth: Mucous membranes are moist.      Pharynx: Oropharynx is clear. No oropharyngeal exudate or posterior oropharyngeal erythema.   Eyes:      Extraocular Movements: Extraocular movements intact.      Conjunctiva/sclera: Conjunctivae normal.      Pupils: Pupils are equal, round, and reactive to light.   Cardiovascular:      Rate and Rhythm: Normal rate and regular rhythm.      Pulses: Normal pulses.      Heart sounds: Normal heart sounds.   Pulmonary:      Effort: Pulmonary effort is normal.      Breath sounds: Normal breath sounds.   Abdominal:      General: Abdomen is flat. Bowel sounds are normal.      Palpations: Abdomen is soft.   Musculoskeletal:         General: Normal range of motion.      Cervical back: Normal range of motion.      Comments: Radial deviation of right index finger PIP joint   Skin:     General: Skin is warm.      Capillary Refill: Capillary refill takes less than 2 seconds.   Neurological:      General: No focal deficit present.      Mental Status: She is alert and oriented to person, place, and time. Mental status is at baseline.   Psychiatric:         Mood and Affect: Mood normal.         Behavior: Behavior normal.         Thought Content: Thought content normal.         Judgment: Judgment normal.        The following data was reviewed by: Magaly Denton DO on 08/08/2023:  Common labs          10/4/2022    10:42   Common Labs   Glucose 96    BUN 16    Creatinine 0.97    Sodium 140    Potassium 5.0    Chloride 102    Calcium 9.8    Total Protein 6.7    Albumin 4.60    Total Bilirubin 0.3    Alkaline Phosphatase 74    AST (SGOT) 20    ALT (SGPT) 28    WBC 5.96    Hemoglobin 13.4    Hematocrit 39.3    Platelets 349    Total Cholesterol 234    Triglycerides 132    HDL Cholesterol 65    LDL  Cholesterol  146      Data reviewed : Previous Cologuard          Assessment and Plan   Diagnoses and all orders for this visit:    1. Welcome to Medicare preventive visit (Primary)    2. Other fatigue  -     Vitamin B12  -     Vitamin D,25-Hydroxy  -     Hemoglobin A1c  -     TINY With / DsDNA, RNP, Sjogrens A / B, De Paz; Future  -     Cyclic Citrul Peptide Antibody, IgG / IgA; Future  -     Rheumatoid Factor    3. Hypothyroidism (acquired)  -     levothyroxine (SYNTHROID, LEVOTHROID) 112 MCG tablet; Take 1 tablet by mouth Daily.  Dispense: 90 tablet; Refill: 3  -     TSH  -     T3, Free  -     T4, Free    4. Essential hypertension  -     losartan (COZAAR) 50 MG tablet; Take 1.5 tablets by mouth Daily.  Dispense: 135 tablet; Refill: 3  -     CBC & Differential  -     Comprehensive Metabolic Panel  -     Lipid Panel  -     Magnesium    5. Body mass index (BMI) 38.0-38.9, adult  -     Vitamin D,25-Hydroxy    6. Other long term (current) drug therapy  -     Hemoglobin A1c    7. Need for Streptococcus pneumoniae vaccination  -     Pneumococcal Conjugate Vaccine 20-Valent (PCV20)    8. Asymptomatic menopause  -     DEXA Bone Density Axial    9. Encounter for colorectal cancer screening  -     Cologuard - Stool, Per Rectum; Future    10. Generalized joint pain  -     TINY With / DsDNA, RNP, Sjogrens A / B, De Paz; Future  -     Cyclic Citrul Peptide Antibody, IgG / IgA; Future  -     Rheumatoid Factor    Other orders  -     ECG 12 Lead      Continue current medication regimen  Monitor blood pressure at home  Low-cholesterol diet  Exercise 30 minutes a day 5 days a week as tolerated  Check for rheumatoid arthritis and other autoimmune diseases that could cause diffuse joint pain and fatigue  Cologuard ordered  DEXA scan ordered  Pneumonia vaccine given  Checking labs today  Counseled on need for fourth COVID-vaccine  Counseled on need for second shingles vaccine     I spent 45 minutes caring for Sandra on this date of  service. This time includes time spent by me in the following activities:preparing for the visit, reviewing tests, obtaining and/or reviewing a separately obtained history, performing a medically appropriate examination and/or evaluation , counseling and educating the patient/family/caregiver, ordering medications, tests, or procedures, documenting information in the medical record, and care coordination  Follow Up   No follow-ups on file.  Patient was given instructions and counseling regarding her condition or for health maintenance advice. Please see specific information pulled into the AVS if appropriate.

## 2023-08-09 DIAGNOSIS — E78.2 MIXED HYPERLIPIDEMIA: Primary | ICD-10-CM

## 2023-08-09 LAB
25(OH)D3+25(OH)D2 SERPL-MCNC: 38.4 NG/ML (ref 30–100)
ALBUMIN SERPL-MCNC: 4.2 G/DL (ref 3.5–5.2)
ALBUMIN/GLOB SERPL: 1.6 G/DL
ALP SERPL-CCNC: 74 U/L (ref 39–117)
ALT SERPL-CCNC: 26 U/L (ref 1–33)
AST SERPL-CCNC: 20 U/L (ref 1–32)
BASOPHILS # BLD AUTO: 0.06 10*3/MM3 (ref 0–0.2)
BASOPHILS NFR BLD AUTO: 0.7 % (ref 0–1.5)
BILIRUB SERPL-MCNC: 0.3 MG/DL (ref 0–1.2)
BUN SERPL-MCNC: 23 MG/DL (ref 8–23)
BUN/CREAT SERPL: 21.5 (ref 7–25)
CALCIUM SERPL-MCNC: 10.3 MG/DL (ref 8.6–10.5)
CHLORIDE SERPL-SCNC: 106 MMOL/L (ref 98–107)
CHOLEST SERPL-MCNC: 241 MG/DL (ref 0–200)
CO2 SERPL-SCNC: 28.6 MMOL/L (ref 22–29)
CREAT SERPL-MCNC: 1.07 MG/DL (ref 0.57–1)
EGFRCR SERPLBLD CKD-EPI 2021: 57.8 ML/MIN/1.73
EOSINOPHIL # BLD AUTO: 0.29 10*3/MM3 (ref 0–0.4)
EOSINOPHIL NFR BLD AUTO: 3.5 % (ref 0.3–6.2)
ERYTHROCYTE [DISTWIDTH] IN BLOOD BY AUTOMATED COUNT: 13.1 % (ref 12.3–15.4)
GLOBULIN SER CALC-MCNC: 2.6 GM/DL
GLUCOSE SERPL-MCNC: 97 MG/DL (ref 65–99)
HBA1C MFR BLD: 5.8 % (ref 4.8–5.6)
HCT VFR BLD AUTO: 41 % (ref 34–46.6)
HDLC SERPL-MCNC: 62 MG/DL (ref 40–60)
HGB BLD-MCNC: 14.1 G/DL (ref 12–15.9)
IMM GRANULOCYTES # BLD AUTO: 0.02 10*3/MM3 (ref 0–0.05)
IMM GRANULOCYTES NFR BLD AUTO: 0.2 % (ref 0–0.5)
LDLC SERPL CALC-MCNC: 156 MG/DL (ref 0–100)
LYMPHOCYTES # BLD AUTO: 1.48 10*3/MM3 (ref 0.7–3.1)
LYMPHOCYTES NFR BLD AUTO: 18 % (ref 19.6–45.3)
MAGNESIUM SERPL-MCNC: 2.4 MG/DL (ref 1.6–2.4)
MCH RBC QN AUTO: 32.6 PG (ref 26.6–33)
MCHC RBC AUTO-ENTMCNC: 34.4 G/DL (ref 31.5–35.7)
MCV RBC AUTO: 94.7 FL (ref 79–97)
MONOCYTES # BLD AUTO: 0.8 10*3/MM3 (ref 0.1–0.9)
MONOCYTES NFR BLD AUTO: 9.7 % (ref 5–12)
NEUTROPHILS # BLD AUTO: 5.57 10*3/MM3 (ref 1.7–7)
NEUTROPHILS NFR BLD AUTO: 67.9 % (ref 42.7–76)
NRBC BLD AUTO-RTO: 0 /100 WBC (ref 0–0.2)
PLATELET # BLD AUTO: 339 10*3/MM3 (ref 140–450)
POTASSIUM SERPL-SCNC: 4.9 MMOL/L (ref 3.5–5.2)
PROT SERPL-MCNC: 6.8 G/DL (ref 6–8.5)
RBC # BLD AUTO: 4.33 10*6/MM3 (ref 3.77–5.28)
SODIUM SERPL-SCNC: 144 MMOL/L (ref 136–145)
T3FREE SERPL-MCNC: 2.3 PG/ML (ref 2–4.4)
T4 FREE SERPL-MCNC: 1.77 NG/DL (ref 0.93–1.7)
TRIGL SERPL-MCNC: 131 MG/DL (ref 0–150)
TSH SERPL DL<=0.005 MIU/L-ACNC: 1.94 UIU/ML (ref 0.27–4.2)
VIT B12 SERPL-MCNC: 958 PG/ML (ref 211–946)
VLDLC SERPL CALC-MCNC: 23 MG/DL (ref 5–40)
WBC # BLD AUTO: 8.22 10*3/MM3 (ref 3.4–10.8)

## 2023-08-09 RX ORDER — ROSUVASTATIN CALCIUM 20 MG/1
20 TABLET, COATED ORAL DAILY
Qty: 90 TABLET | Refills: 3 | Status: SHIPPED | OUTPATIENT
Start: 2023-08-09

## 2023-08-18 ENCOUNTER — APPOINTMENT (OUTPATIENT)
Dept: BONE DENSITY | Facility: HOSPITAL | Age: 68
End: 2023-08-18
Payer: MEDICARE

## 2023-08-18 PROCEDURE — 77080 DXA BONE DENSITY AXIAL: CPT

## 2023-12-27 DIAGNOSIS — I10 ESSENTIAL HYPERTENSION: ICD-10-CM

## 2023-12-27 DIAGNOSIS — E03.9 HYPOTHYROIDISM (ACQUIRED): ICD-10-CM

## 2023-12-27 RX ORDER — LEVOTHYROXINE SODIUM 112 UG/1
112 TABLET ORAL DAILY
Qty: 90 TABLET | Refills: 3 | Status: SHIPPED | OUTPATIENT
Start: 2023-12-27

## 2023-12-27 RX ORDER — LOSARTAN POTASSIUM 50 MG/1
75 TABLET ORAL DAILY
Qty: 135 TABLET | Refills: 3 | Status: SHIPPED | OUTPATIENT
Start: 2023-12-27

## 2023-12-27 NOTE — TELEPHONE ENCOUNTER
Caller: Sandra Guerrero SHASHANK    Relationship: Self    Best call back number:     Requested Prescriptions:   Requested Prescriptions     Pending Prescriptions Disp Refills    losartan (COZAAR) 50 MG tablet 135 tablet 3     Sig: Take 1.5 tablets by mouth Daily.    levothyroxine (SYNTHROID, LEVOTHROID) 112 MCG tablet 90 tablet 3     Sig: Take 1 tablet by mouth Daily.        Pharmacy where request should be sent: [id number] [place], [place] - [address] [facility]  - [phone] [facility] [phone] FX     Last office visit with prescribing clinician: 8/8/2023   Last telemedicine visit with prescribing clinician: Visit date not found   Next office visit with prescribing clinician: Visit date not found     Additional details provided by patient:     Does the patient have less than a 3 day supply:  [] Yes  [x] No    Isis Velásquez Rep   12/27/23 12:41 EST

## 2024-10-01 ENCOUNTER — OFFICE VISIT (OUTPATIENT)
Dept: INTERNAL MEDICINE | Facility: CLINIC | Age: 69
End: 2024-10-01
Payer: MEDICARE

## 2024-10-01 VITALS
WEIGHT: 229 LBS | TEMPERATURE: 97.3 F | HEIGHT: 65 IN | DIASTOLIC BLOOD PRESSURE: 90 MMHG | HEART RATE: 70 BPM | BODY MASS INDEX: 38.15 KG/M2 | OXYGEN SATURATION: 96 % | SYSTOLIC BLOOD PRESSURE: 170 MMHG

## 2024-10-01 DIAGNOSIS — I10 ESSENTIAL HYPERTENSION: ICD-10-CM

## 2024-10-01 DIAGNOSIS — E78.5 DYSLIPIDEMIA: ICD-10-CM

## 2024-10-01 DIAGNOSIS — Z00.00 MEDICARE ANNUAL WELLNESS VISIT, SUBSEQUENT: Primary | ICD-10-CM

## 2024-10-01 DIAGNOSIS — E03.9 HYPOTHYROIDISM (ACQUIRED): ICD-10-CM

## 2024-10-01 DIAGNOSIS — D18.01 CHERRY HEMANGIOMA: ICD-10-CM

## 2024-10-01 DIAGNOSIS — R79.9 ABNORMAL FINDING OF BLOOD CHEMISTRY, UNSPECIFIED: ICD-10-CM

## 2024-10-01 DIAGNOSIS — Z12.31 SCREENING MAMMOGRAM FOR BREAST CANCER: ICD-10-CM

## 2024-10-01 PROCEDURE — 1125F AMNT PAIN NOTED PAIN PRSNT: CPT | Performed by: STUDENT IN AN ORGANIZED HEALTH CARE EDUCATION/TRAINING PROGRAM

## 2024-10-01 PROCEDURE — 3080F DIAST BP >= 90 MM HG: CPT | Performed by: STUDENT IN AN ORGANIZED HEALTH CARE EDUCATION/TRAINING PROGRAM

## 2024-10-01 PROCEDURE — G0439 PPPS, SUBSEQ VISIT: HCPCS | Performed by: STUDENT IN AN ORGANIZED HEALTH CARE EDUCATION/TRAINING PROGRAM

## 2024-10-01 PROCEDURE — 3077F SYST BP >= 140 MM HG: CPT | Performed by: STUDENT IN AN ORGANIZED HEALTH CARE EDUCATION/TRAINING PROGRAM

## 2024-10-01 PROCEDURE — 1170F FXNL STATUS ASSESSED: CPT | Performed by: STUDENT IN AN ORGANIZED HEALTH CARE EDUCATION/TRAINING PROGRAM

## 2024-10-01 PROCEDURE — 1160F RVW MEDS BY RX/DR IN RCRD: CPT | Performed by: STUDENT IN AN ORGANIZED HEALTH CARE EDUCATION/TRAINING PROGRAM

## 2024-10-01 PROCEDURE — 1159F MED LIST DOCD IN RCRD: CPT | Performed by: STUDENT IN AN ORGANIZED HEALTH CARE EDUCATION/TRAINING PROGRAM

## 2024-10-01 RX ORDER — LOSARTAN POTASSIUM 50 MG/1
75 TABLET ORAL DAILY
Qty: 135 TABLET | Refills: 3 | Status: SHIPPED | OUTPATIENT
Start: 2024-10-01

## 2024-10-01 RX ORDER — LEVOTHYROXINE SODIUM 112 UG/1
112 TABLET ORAL DAILY
Qty: 90 TABLET | Refills: 3 | Status: SHIPPED | OUTPATIENT
Start: 2024-10-01

## 2024-10-01 NOTE — PROGRESS NOTES
Subjective   The ABCs of the Annual Wellness Visit  Medicare Wellness Visit      Sandra Guerrero is a 69 y.o. patient who presents for a Medicare Wellness Visit.    The following portions of the patient's history were reviewed and   updated as appropriate: allergies, current medications, past family history, past medical history, past social history, past surgical history, and problem list.    Compared to one year ago, the patient's physical   health is the same.  Compared to one year ago, the patient's mental   health is the same.    Recent Hospitalizations:  She was not admitted to the hospital during the last year.     Current Medical Providers:  Patient Care Team:  Magaly Denton DO as PCP - General (Family Medicine)    Outpatient Medications Prior to Visit   Medication Sig Dispense Refill    levothyroxine (SYNTHROID, LEVOTHROID) 112 MCG tablet Take 1 tablet by mouth Daily. 90 tablet 3    losartan (COZAAR) 50 MG tablet Take 1.5 tablets by mouth Daily. 135 tablet 3    rosuvastatin (Crestor) 20 MG tablet Take 1 tablet by mouth Daily. (Patient not taking: Reported on 10/1/2024) 90 tablet 3     No facility-administered medications prior to visit.     No opioid medication identified on active medication list. I have reviewed chart for other potential  high risk medication/s and harmful drug interactions in the elderly.      Aspirin is not on active medication list.  Aspirin use is not indicated based on review of current medical condition/s. Risk of harm outweighs potential benefits.  .    Patient Active Problem List   Diagnosis    Acid reflux disease    Arthritis    BMI 39.0-39.9,adult    Chronic bronchitis    Hypothyroidism (acquired)    Asthma    Essential hypertension    Physical exam    Screening for colon cancer    Screening mammogram, encounter for    Skin lesion of neck     Advance Care Planning Advance Directive is not on file.  ACP discussion was held with the patient during this visit. Patient has an  "advance directive (not in EMR), copy requested.            Objective   Vitals:    10/01/24 0956   BP: 170/90   Pulse: 70   Temp: 97.3 °F (36.3 °C)   SpO2: 96%   Weight: 104 kg (229 lb)   Height: 165.1 cm (65\")   PainSc:   2       Estimated body mass index is 38.11 kg/m² as calculated from the following:    Height as of this encounter: 165.1 cm (65\").    Weight as of this encounter: 104 kg (229 lb).    Class 2 Severe Obesity (BMI >=35 and <=39.9). Obesity-related health conditions include the following: hypertension and dyslipidemias. Obesity is unchanged. BMI is is above average; BMI management plan is completed. We discussed portion control and increasing exercise.       Does the patient have evidence of cognitive impairment? No                                                                                               Health  Risk Assessment    Smoking Status:  Social History     Tobacco Use   Smoking Status Never    Passive exposure: Never   Smokeless Tobacco Never     Alcohol Consumption:  Social History     Substance and Sexual Activity   Alcohol Use Yes    Comment: occ       Fall Risk Screen  STEADI Fall Risk Assessment was completed, and patient is at LOW risk for falls.Assessment completed on:10/1/2024    Depression Screening:      10/1/2024     9:58 AM   PHQ-2/PHQ-9 Depression Screening   Little Interest or Pleasure in Doing Things 0-->not at all   Feeling Down, Depressed or Hopeless 0-->not at all   PHQ-9: Brief Depression Severity Measure Score 0     Health Habits and Functional and Cognitive Screening:      10/1/2024     9:59 AM   Functional & Cognitive Status   Do you have difficulty preparing food and eating? No   Do you have difficulty bathing yourself, getting dressed or grooming yourself? Yes   Do you have difficulty using the toilet? No   Do you have difficulty moving around from place to place? No   Do you have trouble with steps or getting out of a bed or a chair? Yes   Current Diet Well " Balanced Diet   Dental Exam Up to date   Eye Exam Up to date   Exercise (times per week) 7 times per week   Current Exercises Include Weightlifting;Yard Work   Do you need help using the phone?  No   Are you deaf or do you have serious difficulty hearing?  No   Do you need help to go to places out of walking distance? No   Do you need help shopping? No   Do you need help preparing meals?  No   Do you need help with housework?  No   Do you need help with laundry? No   Do you need help taking your medications? No   Do you need help managing money? No   Do you ever drive or ride in a car without wearing a seat belt? No   Have you felt unusual stress, anger or loneliness in the last month? Yes   Who do you live with? Sibling   If you need help, do you have trouble finding someone available to you? No   Have you been bothered in the last four weeks by sexual problems? No   Do you have difficulty concentrating, remembering or making decisions? No           Age-appropriate Screening Schedule:  Refer to the list below for future screening recommendations based on patient's age, sex and/or medical conditions. Orders for these recommended tests are listed in the plan section. The patient has been provided with a written plan.    Health Maintenance List  Health Maintenance   Topic Date Due    ANNUAL WELLNESS VISIT  08/08/2024    LIPID PANEL  08/08/2024    BMI FOLLOWUP  08/08/2024    MAMMOGRAM  11/29/2024    ZOSTER VACCINE (2 of 2) 10/01/2024 (Originally 8/1/2021)    COVID-19 Vaccine (7 - 2023-24 season) 12/21/2024 (Originally 9/1/2024)    INFLUENZA VACCINE  03/31/2025 (Originally 8/1/2024)    COLORECTAL CANCER SCREENING  01/08/2027 (Originally 1955)    DXA SCAN  08/18/2025    TDAP/TD VACCINES (2 - Td or Tdap) 01/18/2029    HEPATITIS C SCREENING  Completed    Pneumococcal Vaccine 65+  Completed    PAP SMEAR  Discontinued                                                                                                                                                 CMS Preventative Services Quick Reference  Risk Factors Identified During Encounter  Immunizations Discussed/Encouraged: Influenza, Shingrix, and COVID19  Dental Screening Recommended  Vision Screening Recommended    The above risks/problems have been discussed with the patient.  Pertinent information has been shared with the patient in the After Visit Summary.  An After Visit Summary and PPPS were made available to the patient.    Follow Up:   Next Medicare Wellness visit to be scheduled in 1 year.     Assessment & Plan  Essential hypertension    Hypothyroidism (acquired)    Dyslipidemia    Medicare annual wellness visit, subsequent    Body mass index (BMI) of 38.0 to 38.9 in adult    Abnormal finding of blood chemistry, unspecified    Screening mammogram for breast cancer    Cherry hemangioma      Orders Placed This Encounter   Procedures    Mammo screening digital tomosynthesis bilateral w CAD    CBC (No Diff)    Comprehensive Metabolic Panel    Hemoglobin A1c    Lipid Panel    TSH    Vitamin B12    Vitamin D,25-Hydroxy    T3, Free    T4, Free    Ambulatory Referral to Dermatology     New Medications Ordered This Visit   Medications    losartan (COZAAR) 50 MG tablet     Sig: Take 1.5 tablets by mouth Daily.     Dispense:  135 tablet     Refill:  3    levothyroxine (SYNTHROID, LEVOTHROID) 112 MCG tablet     Sig: Take 1 tablet by mouth Daily.     Dispense:  90 tablet     Refill:  3          Follow Up:   No follow-ups on file.

## 2024-10-02 LAB
25(OH)D3+25(OH)D2 SERPL-MCNC: 37.3 NG/ML (ref 30–100)
ALBUMIN SERPL-MCNC: 4 G/DL (ref 3.5–5.2)
ALBUMIN/GLOB SERPL: 1.8 G/DL
ALP SERPL-CCNC: 65 U/L (ref 39–117)
ALT SERPL-CCNC: 21 U/L (ref 1–33)
AST SERPL-CCNC: 19 U/L (ref 1–32)
BILIRUB SERPL-MCNC: 0.4 MG/DL (ref 0–1.2)
BUN SERPL-MCNC: 22 MG/DL (ref 8–23)
BUN/CREAT SERPL: 22 (ref 7–25)
CALCIUM SERPL-MCNC: 9.3 MG/DL (ref 8.6–10.5)
CHLORIDE SERPL-SCNC: 105 MMOL/L (ref 98–107)
CHOLEST SERPL-MCNC: 238 MG/DL (ref 0–200)
CO2 SERPL-SCNC: 27.7 MMOL/L (ref 22–29)
CREAT SERPL-MCNC: 1 MG/DL (ref 0.57–1)
EGFRCR SERPLBLD CKD-EPI 2021: 61.9 ML/MIN/1.73
ERYTHROCYTE [DISTWIDTH] IN BLOOD BY AUTOMATED COUNT: 13.5 % (ref 12.3–15.4)
GLOBULIN SER CALC-MCNC: 2.2 GM/DL
GLUCOSE SERPL-MCNC: 91 MG/DL (ref 65–99)
HBA1C MFR BLD: 5.8 % (ref 4.8–5.6)
HCT VFR BLD AUTO: 40.5 % (ref 34–46.6)
HDLC SERPL-MCNC: 69 MG/DL (ref 40–60)
HGB BLD-MCNC: 13 G/DL (ref 12–15.9)
LDLC SERPL CALC-MCNC: 155 MG/DL (ref 0–100)
MCH RBC QN AUTO: 31.8 PG (ref 26.6–33)
MCHC RBC AUTO-ENTMCNC: 32.1 G/DL (ref 31.5–35.7)
MCV RBC AUTO: 99 FL (ref 79–97)
PLATELET # BLD AUTO: 340 10*3/MM3 (ref 140–450)
POTASSIUM SERPL-SCNC: 4.6 MMOL/L (ref 3.5–5.2)
PROT SERPL-MCNC: 6.2 G/DL (ref 6–8.5)
RBC # BLD AUTO: 4.09 10*6/MM3 (ref 3.77–5.28)
SODIUM SERPL-SCNC: 141 MMOL/L (ref 136–145)
T3FREE SERPL-MCNC: 2.1 PG/ML (ref 2–4.4)
T4 FREE SERPL-MCNC: 1.78 NG/DL (ref 0.92–1.68)
TRIGL SERPL-MCNC: 84 MG/DL (ref 0–150)
TSH SERPL DL<=0.005 MIU/L-ACNC: 2.07 UIU/ML (ref 0.27–4.2)
VIT B12 SERPL-MCNC: 754 PG/ML (ref 211–946)
VLDLC SERPL CALC-MCNC: 14 MG/DL (ref 5–40)
WBC # BLD AUTO: 7.83 10*3/MM3 (ref 3.4–10.8)

## 2025-01-20 ENCOUNTER — HOSPITAL ENCOUNTER (OUTPATIENT)
Dept: MAMMOGRAPHY | Facility: HOSPITAL | Age: 70
Discharge: HOME | End: 2025-01-20
Admitting: STUDENT IN AN ORGANIZED HEALTH CARE EDUCATION/TRAINING PROGRAM
Payer: MEDICARE

## 2025-01-20 DIAGNOSIS — Z12.31 SCREENING MAMMOGRAM FOR BREAST CANCER: ICD-10-CM

## 2025-01-20 PROCEDURE — 77063 BREAST TOMOSYNTHESIS BI: CPT

## 2025-01-20 PROCEDURE — 77067 SCR MAMMO BI INCL CAD: CPT

## 2025-01-22 PROCEDURE — 77067 SCR MAMMO BI INCL CAD: CPT | Performed by: RADIOLOGY

## 2025-01-22 PROCEDURE — 77063 BREAST TOMOSYNTHESIS BI: CPT | Performed by: RADIOLOGY

## 2025-07-18 NOTE — TELEPHONE ENCOUNTER
Left message for patient to return my call.   Please notify pt that liver enzymes are slighty increased would like her to avoid alcohol and tylneol for 2 weeks and repeat lab. Other labs are stable

## 2025-07-22 ENCOUNTER — OFFICE VISIT (OUTPATIENT)
Dept: INTERNAL MEDICINE | Facility: CLINIC | Age: 70
End: 2025-07-22
Payer: MEDICARE

## 2025-07-22 VITALS
OXYGEN SATURATION: 97 % | DIASTOLIC BLOOD PRESSURE: 78 MMHG | WEIGHT: 230.2 LBS | RESPIRATION RATE: 20 BRPM | SYSTOLIC BLOOD PRESSURE: 122 MMHG | HEIGHT: 65 IN | HEART RATE: 90 BPM | TEMPERATURE: 97.2 F | BODY MASS INDEX: 38.35 KG/M2

## 2025-07-22 DIAGNOSIS — E78.2 MIXED HYPERLIPIDEMIA: ICD-10-CM

## 2025-07-22 DIAGNOSIS — R79.9 ABNORMAL FINDING OF BLOOD CHEMISTRY, UNSPECIFIED: ICD-10-CM

## 2025-07-22 DIAGNOSIS — I83.92 ASYMPTOMATIC VARICOSE VEINS OF LEFT LOWER EXTREMITY: ICD-10-CM

## 2025-07-22 DIAGNOSIS — Z00.00 MEDICARE ANNUAL WELLNESS VISIT, SUBSEQUENT: ICD-10-CM

## 2025-07-22 DIAGNOSIS — E03.9 HYPOTHYROIDISM (ACQUIRED): Primary | ICD-10-CM

## 2025-07-22 DIAGNOSIS — Z12.12 ENCOUNTER FOR COLORECTAL CANCER SCREENING: ICD-10-CM

## 2025-07-22 DIAGNOSIS — E55.9 VITAMIN D DEFICIENCY: ICD-10-CM

## 2025-07-22 DIAGNOSIS — Z12.11 ENCOUNTER FOR COLORECTAL CANCER SCREENING: ICD-10-CM

## 2025-07-22 DIAGNOSIS — G89.29 CHRONIC PAIN OF LEFT KNEE: ICD-10-CM

## 2025-07-22 DIAGNOSIS — Z78.0 ASYMPTOMATIC MENOPAUSE: ICD-10-CM

## 2025-07-22 DIAGNOSIS — M25.562 CHRONIC PAIN OF LEFT KNEE: ICD-10-CM

## 2025-07-22 DIAGNOSIS — I10 ESSENTIAL HYPERTENSION: ICD-10-CM

## 2025-07-22 DIAGNOSIS — M19.90 ARTHRITIS: ICD-10-CM

## 2025-07-22 PROCEDURE — 3074F SYST BP LT 130 MM HG: CPT | Performed by: STUDENT IN AN ORGANIZED HEALTH CARE EDUCATION/TRAINING PROGRAM

## 2025-07-22 PROCEDURE — 1125F AMNT PAIN NOTED PAIN PRSNT: CPT | Performed by: STUDENT IN AN ORGANIZED HEALTH CARE EDUCATION/TRAINING PROGRAM

## 2025-07-22 PROCEDURE — 1159F MED LIST DOCD IN RCRD: CPT | Performed by: STUDENT IN AN ORGANIZED HEALTH CARE EDUCATION/TRAINING PROGRAM

## 2025-07-22 PROCEDURE — G2211 COMPLEX E/M VISIT ADD ON: HCPCS | Performed by: STUDENT IN AN ORGANIZED HEALTH CARE EDUCATION/TRAINING PROGRAM

## 2025-07-22 PROCEDURE — 99214 OFFICE O/P EST MOD 30 MIN: CPT | Performed by: STUDENT IN AN ORGANIZED HEALTH CARE EDUCATION/TRAINING PROGRAM

## 2025-07-22 PROCEDURE — 1160F RVW MEDS BY RX/DR IN RCRD: CPT | Performed by: STUDENT IN AN ORGANIZED HEALTH CARE EDUCATION/TRAINING PROGRAM

## 2025-07-22 PROCEDURE — 3078F DIAST BP <80 MM HG: CPT | Performed by: STUDENT IN AN ORGANIZED HEALTH CARE EDUCATION/TRAINING PROGRAM

## 2025-07-22 RX ORDER — LEVOTHYROXINE SODIUM 112 UG/1
112 TABLET ORAL DAILY
Qty: 90 TABLET | Refills: 3 | Status: SHIPPED | OUTPATIENT
Start: 2025-07-22

## 2025-07-22 RX ORDER — LOSARTAN POTASSIUM 50 MG/1
75 TABLET ORAL DAILY
Qty: 135 TABLET | Refills: 3 | Status: SHIPPED | OUTPATIENT
Start: 2025-07-22

## 2025-07-22 NOTE — PROGRESS NOTES
Subjective   Sandra Guerrero is a 69 y.o. female.     History of Present Illness  Presents for follow up  Htn - well controlled. No cp soa diaphoresis  Hypothyroid - doing well on meds with no complaints  Arthritis in her hands and knees are getting much worse. Having stiffness and pain. Her podiatrist helps with her ankles and knees.   Noticed a varicose vein in left leg. Getting worse. Is wearing compression socks. Would like to see vascular. States she is ok with paying for procedure if insurance won't cover because it is painful at times.       The following portions of the patient's history were reviewed and updated as appropriate: allergies, current medications, past family history, past medical history, past social history, past surgical history, and problem list.    Review of Systems   All other systems reviewed and are negative.      Objective   Physical Exam  Vitals and nursing note reviewed.   Constitutional:       Appearance: Normal appearance.   HENT:      Head: Normocephalic and atraumatic.      Right Ear: External ear normal.      Left Ear: External ear normal.      Nose: Nose normal.      Mouth/Throat:      Mouth: Mucous membranes are moist.      Pharynx: Oropharynx is clear. No oropharyngeal exudate or posterior oropharyngeal erythema.   Eyes:      Extraocular Movements: Extraocular movements intact.      Conjunctiva/sclera: Conjunctivae normal.      Pupils: Pupils are equal, round, and reactive to light.   Cardiovascular:      Rate and Rhythm: Normal rate and regular rhythm.      Pulses: Normal pulses.      Heart sounds: Normal heart sounds.   Pulmonary:      Effort: Pulmonary effort is normal.      Breath sounds: Normal breath sounds.   Abdominal:      General: Abdomen is flat. Bowel sounds are normal.      Palpations: Abdomen is soft.   Musculoskeletal:         General: Normal range of motion.      Cervical back: Normal range of motion.   Skin:     General: Skin is warm.      Capillary Refill:  Capillary refill takes less than 2 seconds.   Neurological:      General: No focal deficit present.      Mental Status: She is alert and oriented to person, place, and time. Mental status is at baseline.   Psychiatric:         Mood and Affect: Mood normal.         Behavior: Behavior normal.         Thought Content: Thought content normal.         Judgment: Judgment normal.         Assessment & Plan   Diagnoses and all orders for this visit:    1. Hypothyroidism (acquired) (Primary)  -     CBC (No Diff)  -     Comprehensive Metabolic Panel  -     Hemoglobin A1c  -     Lipid Panel  -     TSH  -     Vitamin D,25-Hydroxy  -     Vitamin B12  -     T3, Free  -     T4, Free  -     levothyroxine (SYNTHROID, LEVOTHROID) 112 MCG tablet; Take 1 tablet by mouth Daily.  Dispense: 90 tablet; Refill: 3  -     losartan (COZAAR) 50 MG tablet; Take 1.5 tablets by mouth Daily.  Dispense: 135 tablet; Refill: 3    2. Essential hypertension  -     CBC (No Diff)  -     Comprehensive Metabolic Panel  -     Hemoglobin A1c  -     Lipid Panel  -     TSH  -     Vitamin D,25-Hydroxy  -     Vitamin B12  -     T3, Free  -     T4, Free  -     levothyroxine (SYNTHROID, LEVOTHROID) 112 MCG tablet; Take 1 tablet by mouth Daily.  Dispense: 90 tablet; Refill: 3  -     losartan (COZAAR) 50 MG tablet; Take 1.5 tablets by mouth Daily.  Dispense: 135 tablet; Refill: 3    3. Encounter for colorectal cancer screening  -     CBC (No Diff)  -     Comprehensive Metabolic Panel  -     Hemoglobin A1c  -     Lipid Panel  -     TSH  -     Vitamin D,25-Hydroxy  -     Vitamin B12  -     T3, Free  -     T4, Free    4. Mixed hyperlipidemia  -     CBC (No Diff)  -     Comprehensive Metabolic Panel  -     Hemoglobin A1c  -     Lipid Panel  -     TSH  -     Vitamin D,25-Hydroxy  -     Vitamin B12  -     T3, Free  -     T4, Free    5. Body mass index (BMI) of 38.0 to 38.9 in adult  -     CBC (No Diff)  -     Comprehensive Metabolic Panel  -     Hemoglobin A1c  -     Lipid  Panel  -     TSH  -     Vitamin D,25-Hydroxy  -     Vitamin B12  -     T3, Free  -     T4, Free    6. Asymptomatic menopause  -     CBC (No Diff)  -     Comprehensive Metabolic Panel  -     Hemoglobin A1c  -     Lipid Panel  -     TSH  -     Vitamin D,25-Hydroxy  -     Vitamin B12  -     T3, Free  -     T4, Free    7. Vitamin D deficiency  -     CBC (No Diff)  -     Comprehensive Metabolic Panel  -     Hemoglobin A1c  -     Lipid Panel  -     TSH  -     Vitamin D,25-Hydroxy  -     Vitamin B12  -     T3, Free  -     T4, Free    8. Abnormal finding of blood chemistry, unspecified  -     Hemoglobin A1c    9. Medicare annual wellness visit, subsequent  -     levothyroxine (SYNTHROID, LEVOTHROID) 112 MCG tablet; Take 1 tablet by mouth Daily.  Dispense: 90 tablet; Refill: 3  -     losartan (COZAAR) 50 MG tablet; Take 1.5 tablets by mouth Daily.  Dispense: 135 tablet; Refill: 3    10. Arthritis  -     Ambulatory Referral to Rheumatology    11. Chronic pain of left knee  -     Ambulatory Referral to Orthopedic Surgery    12. Asymptomatic varicose veins of left lower extremity  -     Ambulatory Referral to Vascular Surgery

## 2025-07-23 LAB
25(OH)D3+25(OH)D2 SERPL-MCNC: 41 NG/ML (ref 30–100)
ALBUMIN SERPL-MCNC: 3.9 G/DL (ref 3.5–5.2)
ALBUMIN/GLOB SERPL: 1.6 G/DL
ALP SERPL-CCNC: 78 U/L (ref 39–117)
ALT SERPL-CCNC: 17 U/L (ref 1–33)
AST SERPL-CCNC: 19 U/L (ref 1–32)
BILIRUB SERPL-MCNC: 0.3 MG/DL (ref 0–1.2)
BUN SERPL-MCNC: 18 MG/DL (ref 8–23)
BUN/CREAT SERPL: 18.6 (ref 7–25)
CALCIUM SERPL-MCNC: 9.4 MG/DL (ref 8.6–10.5)
CHLORIDE SERPL-SCNC: 106 MMOL/L (ref 98–107)
CHOLEST SERPL-MCNC: 214 MG/DL (ref 0–200)
CO2 SERPL-SCNC: 26.1 MMOL/L (ref 22–29)
CREAT SERPL-MCNC: 0.97 MG/DL (ref 0.57–1)
EGFRCR SERPLBLD CKD-EPI 2021: 64.2 ML/MIN/1.73
ERYTHROCYTE [DISTWIDTH] IN BLOOD BY AUTOMATED COUNT: 12.8 % (ref 12.3–15.4)
GLOBULIN SER CALC-MCNC: 2.5 GM/DL
GLUCOSE SERPL-MCNC: 115 MG/DL (ref 65–99)
HBA1C MFR BLD: 5.9 % (ref 4.8–5.6)
HCT VFR BLD AUTO: 40 % (ref 34–46.6)
HDLC SERPL-MCNC: 63 MG/DL (ref 40–60)
HGB BLD-MCNC: 13.2 G/DL (ref 12–15.9)
LDLC SERPL CALC-MCNC: 129 MG/DL (ref 0–100)
MCH RBC QN AUTO: 32.8 PG (ref 26.6–33)
MCHC RBC AUTO-ENTMCNC: 33 G/DL (ref 31.5–35.7)
MCV RBC AUTO: 99.5 FL (ref 79–97)
PLATELET # BLD AUTO: 325 10*3/MM3 (ref 140–450)
POTASSIUM SERPL-SCNC: 4.6 MMOL/L (ref 3.5–5.2)
PROT SERPL-MCNC: 6.4 G/DL (ref 6–8.5)
RBC # BLD AUTO: 4.02 10*6/MM3 (ref 3.77–5.28)
SODIUM SERPL-SCNC: 143 MMOL/L (ref 136–145)
T3FREE SERPL-MCNC: 2.4 PG/ML (ref 2–4.4)
T4 FREE SERPL-MCNC: 1.78 NG/DL (ref 0.92–1.68)
TRIGL SERPL-MCNC: 124 MG/DL (ref 0–150)
TSH SERPL DL<=0.005 MIU/L-ACNC: 1.54 UIU/ML (ref 0.27–4.2)
VIT B12 SERPL-MCNC: >2000 PG/ML (ref 211–946)
VLDLC SERPL CALC-MCNC: 22 MG/DL (ref 5–40)
WBC # BLD AUTO: 7.32 10*3/MM3 (ref 3.4–10.8)

## 2025-07-25 ENCOUNTER — TELEPHONE (OUTPATIENT)
Dept: INTERNAL MEDICINE | Facility: CLINIC | Age: 70
End: 2025-07-25
Payer: MEDICARE

## 2025-07-25 NOTE — TELEPHONE ENCOUNTER
From Dr. Denton:      A1c still prediabetic. Recommend low carb diet and daily exercise as tolerated  Cholesterol has improved from last check  B12 too high. Recommend decreasing dosage of b vitamins if she is taking any

## 2025-08-06 ENCOUNTER — OFFICE VISIT (OUTPATIENT)
Age: 70
End: 2025-08-06
Payer: MEDICARE

## 2025-08-06 VITALS
DIASTOLIC BLOOD PRESSURE: 86 MMHG | WEIGHT: 228.5 LBS | HEIGHT: 62 IN | BODY MASS INDEX: 42.05 KG/M2 | SYSTOLIC BLOOD PRESSURE: 142 MMHG

## 2025-08-06 DIAGNOSIS — E66.01 OBESITY, MORBID, BMI 40.0-49.9: ICD-10-CM

## 2025-08-06 DIAGNOSIS — M25.561 PAIN IN BOTH KNEES, UNSPECIFIED CHRONICITY: Primary | ICD-10-CM

## 2025-08-06 DIAGNOSIS — M25.562 PAIN IN BOTH KNEES, UNSPECIFIED CHRONICITY: Primary | ICD-10-CM

## 2025-08-06 DIAGNOSIS — M17.0 PRIMARY OSTEOARTHRITIS OF BOTH KNEES: ICD-10-CM

## 2025-08-06 DIAGNOSIS — I10 ESSENTIAL HYPERTENSION: ICD-10-CM

## 2025-08-27 ENCOUNTER — OFFICE VISIT (OUTPATIENT)
Age: 70
End: 2025-08-27
Payer: MEDICARE

## 2025-08-27 VITALS
DIASTOLIC BLOOD PRESSURE: 84 MMHG | WEIGHT: 228 LBS | BODY MASS INDEX: 41.96 KG/M2 | SYSTOLIC BLOOD PRESSURE: 132 MMHG | HEIGHT: 62 IN

## 2025-08-27 DIAGNOSIS — M17.0 PRIMARY OSTEOARTHRITIS OF BOTH KNEES: Primary | ICD-10-CM

## 2025-08-27 RX ORDER — BUPIVACAINE HYDROCHLORIDE 2.5 MG/ML
4 INJECTION, SOLUTION EPIDURAL; INFILTRATION; INTRACAUDAL; PERINEURAL
Status: COMPLETED | OUTPATIENT
Start: 2025-08-27 | End: 2025-08-27

## 2025-08-27 RX ORDER — LIDOCAINE HYDROCHLORIDE 10 MG/ML
4 INJECTION, SOLUTION EPIDURAL; INFILTRATION; INTRACAUDAL; PERINEURAL
Status: COMPLETED | OUTPATIENT
Start: 2025-08-27 | End: 2025-08-27

## 2025-08-27 RX ORDER — DEXAMETHASONE SODIUM PHOSPHATE 4 MG/ML
4 INJECTION, SOLUTION INTRA-ARTICULAR; INTRALESIONAL; INTRAMUSCULAR; INTRAVENOUS; SOFT TISSUE
Status: COMPLETED | OUTPATIENT
Start: 2025-08-27 | End: 2025-08-27

## 2025-08-27 RX ADMIN — BUPIVACAINE HYDROCHLORIDE 4 ML: 2.5 INJECTION, SOLUTION EPIDURAL; INFILTRATION; INTRACAUDAL; PERINEURAL at 09:45

## 2025-08-27 RX ADMIN — DEXAMETHASONE SODIUM PHOSPHATE 4 MG: 4 INJECTION, SOLUTION INTRA-ARTICULAR; INTRALESIONAL; INTRAMUSCULAR; INTRAVENOUS; SOFT TISSUE at 09:45

## 2025-08-27 RX ADMIN — LIDOCAINE HYDROCHLORIDE 4 ML: 10 INJECTION, SOLUTION EPIDURAL; INFILTRATION; INTRACAUDAL; PERINEURAL at 09:45
